# Patient Record
Sex: FEMALE | Race: WHITE | NOT HISPANIC OR LATINO | URBAN - METROPOLITAN AREA
[De-identification: names, ages, dates, MRNs, and addresses within clinical notes are randomized per-mention and may not be internally consistent; named-entity substitution may affect disease eponyms.]

---

## 2019-02-26 ENCOUNTER — OUTPATIENT (OUTPATIENT)
Dept: OUTPATIENT SERVICES | Facility: HOSPITAL | Age: 74
LOS: 1 days | End: 2019-02-26
Payer: MEDICARE

## 2019-02-26 ENCOUNTER — RESULT REVIEW (OUTPATIENT)
Age: 74
End: 2019-02-26

## 2019-02-26 DIAGNOSIS — Z80.3 FAMILY HISTORY OF MALIGNANT NEOPLASM OF BREAST: ICD-10-CM

## 2019-02-26 DIAGNOSIS — C50.411 MALIGNANT NEOPLASM OF UPPER-OUTER QUADRANT OF RIGHT FEMALE BREAST: ICD-10-CM

## 2019-02-26 DIAGNOSIS — N62 HYPERTROPHY OF BREAST: ICD-10-CM

## 2019-02-26 DIAGNOSIS — Z17.0 ESTROGEN RECEPTOR POSITIVE STATUS [ER+]: ICD-10-CM

## 2019-02-26 LAB — SURGICAL PATHOLOGY STUDY: SIGNIFICANT CHANGE UP

## 2019-02-26 PROCEDURE — 88321 CONSLTJ&REPRT SLD PREP ELSWR: CPT

## 2019-02-27 ENCOUNTER — OUTPATIENT (OUTPATIENT)
Dept: OUTPATIENT SERVICES | Facility: HOSPITAL | Age: 74
LOS: 1 days | End: 2019-02-27
Payer: MEDICARE

## 2019-02-27 VITALS — WEIGHT: 164.91 LBS | HEIGHT: 63 IN

## 2019-02-27 VITALS
HEIGHT: 63 IN | RESPIRATION RATE: 18 BRPM | HEART RATE: 74 BPM | DIASTOLIC BLOOD PRESSURE: 78 MMHG | WEIGHT: 164.91 LBS | SYSTOLIC BLOOD PRESSURE: 150 MMHG | TEMPERATURE: 99 F | OXYGEN SATURATION: 96 %

## 2019-02-27 DIAGNOSIS — Z17.0 ESTROGEN RECEPTOR POSITIVE STATUS [ER+]: ICD-10-CM

## 2019-02-27 DIAGNOSIS — Z01.818 ENCOUNTER FOR OTHER PREPROCEDURAL EXAMINATION: ICD-10-CM

## 2019-02-27 DIAGNOSIS — Z96.641 PRESENCE OF RIGHT ARTIFICIAL HIP JOINT: Chronic | ICD-10-CM

## 2019-02-27 DIAGNOSIS — Z80.3 FAMILY HISTORY OF MALIGNANT NEOPLASM OF BREAST: ICD-10-CM

## 2019-02-27 DIAGNOSIS — Z90.710 ACQUIRED ABSENCE OF BOTH CERVIX AND UTERUS: Chronic | ICD-10-CM

## 2019-02-27 DIAGNOSIS — N62 HYPERTROPHY OF BREAST: ICD-10-CM

## 2019-02-27 DIAGNOSIS — C50.411 MALIGNANT NEOPLASM OF UPPER-OUTER QUADRANT OF RIGHT FEMALE BREAST: ICD-10-CM

## 2019-02-27 LAB
ANION GAP SERPL CALC-SCNC: 9 MMOL/L — SIGNIFICANT CHANGE UP (ref 5–17)
BUN SERPL-MCNC: 31 MG/DL — HIGH (ref 7–23)
CALCIUM SERPL-MCNC: 10.1 MG/DL — SIGNIFICANT CHANGE UP (ref 8.4–10.5)
CHLORIDE SERPL-SCNC: 105 MMOL/L — SIGNIFICANT CHANGE UP (ref 96–108)
CO2 SERPL-SCNC: 30 MMOL/L — SIGNIFICANT CHANGE UP (ref 22–31)
CREAT SERPL-MCNC: 1.35 MG/DL — HIGH (ref 0.5–1.3)
GLUCOSE SERPL-MCNC: 139 MG/DL — HIGH (ref 70–99)
HCT VFR BLD CALC: 53.8 % — HIGH (ref 34.5–45)
HGB BLD-MCNC: 17 G/DL — HIGH (ref 11.5–15.5)
MCHC RBC-ENTMCNC: 31.6 GM/DL — LOW (ref 32–36)
MCHC RBC-ENTMCNC: 31.9 PG — SIGNIFICANT CHANGE UP (ref 27–34)
MCV RBC AUTO: 100.9 FL — HIGH (ref 80–100)
PLATELET # BLD AUTO: 254 K/UL — SIGNIFICANT CHANGE UP (ref 150–400)
POTASSIUM SERPL-MCNC: 4.7 MMOL/L — SIGNIFICANT CHANGE UP (ref 3.5–5.3)
POTASSIUM SERPL-SCNC: 4.7 MMOL/L — SIGNIFICANT CHANGE UP (ref 3.5–5.3)
RBC # BLD: 5.33 M/UL — HIGH (ref 3.8–5.2)
RBC # FLD: 15 % — HIGH (ref 10.3–14.5)
SODIUM SERPL-SCNC: 144 MMOL/L — SIGNIFICANT CHANGE UP (ref 135–145)
WBC # BLD: 8.4 K/UL — SIGNIFICANT CHANGE UP (ref 3.8–10.5)
WBC # FLD AUTO: 8.4 K/UL — SIGNIFICANT CHANGE UP (ref 3.8–10.5)

## 2019-02-27 PROCEDURE — 93010 ELECTROCARDIOGRAM REPORT: CPT | Mod: NC

## 2019-02-27 PROCEDURE — 85027 COMPLETE CBC AUTOMATED: CPT

## 2019-02-27 PROCEDURE — 93005 ELECTROCARDIOGRAM TRACING: CPT

## 2019-02-27 PROCEDURE — 36415 COLL VENOUS BLD VENIPUNCTURE: CPT

## 2019-02-27 PROCEDURE — 80048 BASIC METABOLIC PNL TOTAL CA: CPT

## 2019-02-27 PROCEDURE — G0463: CPT

## 2019-02-27 NOTE — H&P PST ADULT - FAMILY HISTORY
Father  Still living? No  Family history of meningitis, Age at diagnosis: Age Unknown     Mother  Still living? No  Family history of breast cancer, Age at diagnosis: Age Unknown  Family history of hypertension, Age at diagnosis: Age Unknown     Sibling  Still living? Yes, Estimated age: Age Unknown  Family history of thyroid disease, Age at diagnosis: Age Unknown

## 2019-02-27 NOTE — H&P PST ADULT - RS GEN PE MLT RESP DETAILS PC
good air movement/no chest wall tenderness/airway patent/normal/breath sounds equal/clear to auscultation bilaterally/respirations non-labored

## 2019-02-27 NOTE — H&P PST ADULT - HISTORY OF PRESENT ILLNESS
72 y/o female presents to PST. A per patient she had routine mammo and sonogram done 01/2019 and abnormality was noted to right breast, biopsy done and resulted malignant neoplasm.

## 2019-02-27 NOTE — H&P PST ADULT - PROBLEM SELECTOR PROBLEM 1
Malignant neoplasm of upper-outer quadrant of right female breast, unspecified estrogen receptor status

## 2019-02-27 NOTE — H&P PST ADULT - PMH
Diabetes mellitus    Essential hypertension    Gout    Hyperlipemia    Hypothyroidism    Malignant neoplasm of upper-outer quadrant of right female breast, unspecified estrogen receptor status    Prolapsed uterus

## 2019-02-28 ENCOUNTER — TRANSCRIPTION ENCOUNTER (OUTPATIENT)
Age: 74
End: 2019-02-28

## 2019-03-01 ENCOUNTER — OUTPATIENT (OUTPATIENT)
Dept: OUTPATIENT SERVICES | Facility: HOSPITAL | Age: 74
LOS: 1 days | End: 2019-03-01
Payer: MEDICARE

## 2019-03-01 ENCOUNTER — RESULT REVIEW (OUTPATIENT)
Age: 74
End: 2019-03-01

## 2019-03-01 VITALS
DIASTOLIC BLOOD PRESSURE: 70 MMHG | TEMPERATURE: 97 F | OXYGEN SATURATION: 99 % | RESPIRATION RATE: 16 BRPM | SYSTOLIC BLOOD PRESSURE: 126 MMHG | HEART RATE: 70 BPM

## 2019-03-01 VITALS
DIASTOLIC BLOOD PRESSURE: 89 MMHG | TEMPERATURE: 98 F | RESPIRATION RATE: 15 BRPM | HEART RATE: 80 BPM | HEIGHT: 63 IN | SYSTOLIC BLOOD PRESSURE: 146 MMHG | WEIGHT: 164.91 LBS | OXYGEN SATURATION: 99 %

## 2019-03-01 DIAGNOSIS — C50.411 MALIGNANT NEOPLASM OF UPPER-OUTER QUADRANT OF RIGHT FEMALE BREAST: ICD-10-CM

## 2019-03-01 DIAGNOSIS — Z90.710 ACQUIRED ABSENCE OF BOTH CERVIX AND UTERUS: Chronic | ICD-10-CM

## 2019-03-01 DIAGNOSIS — Z17.0 ESTROGEN RECEPTOR POSITIVE STATUS [ER+]: ICD-10-CM

## 2019-03-01 DIAGNOSIS — Z80.3 FAMILY HISTORY OF MALIGNANT NEOPLASM OF BREAST: ICD-10-CM

## 2019-03-01 DIAGNOSIS — Z96.641 PRESENCE OF RIGHT ARTIFICIAL HIP JOINT: Chronic | ICD-10-CM

## 2019-03-01 DIAGNOSIS — N62 HYPERTROPHY OF BREAST: ICD-10-CM

## 2019-03-01 LAB
GLUCOSE BLDC GLUCOMTR-MCNC: 102 MG/DL — HIGH (ref 70–99)
GLUCOSE BLDC GLUCOMTR-MCNC: 121 MG/DL — HIGH (ref 70–99)

## 2019-03-01 PROCEDURE — 88305 TISSUE EXAM BY PATHOLOGIST: CPT | Mod: 26

## 2019-03-01 PROCEDURE — C1889: CPT

## 2019-03-01 PROCEDURE — 19281 PERQ DEVICE BREAST 1ST IMAG: CPT

## 2019-03-01 PROCEDURE — 88342 IMHCHEM/IMCYTCHM 1ST ANTB: CPT

## 2019-03-01 PROCEDURE — 38900 IO MAP OF SENT LYMPH NODE: CPT | Mod: AS,RT

## 2019-03-01 PROCEDURE — 88334 PATH CONSLTJ SURG CYTO XM EA: CPT

## 2019-03-01 PROCEDURE — 82962 GLUCOSE BLOOD TEST: CPT

## 2019-03-01 PROCEDURE — 88307 TISSUE EXAM BY PATHOLOGIST: CPT

## 2019-03-01 PROCEDURE — 38900 IO MAP OF SENT LYMPH NODE: CPT

## 2019-03-01 PROCEDURE — 88342 IMHCHEM/IMCYTCHM 1ST ANTB: CPT | Mod: 26

## 2019-03-01 PROCEDURE — 38525 BIOPSY/REMOVAL LYMPH NODES: CPT | Mod: RT

## 2019-03-01 PROCEDURE — 76098 X-RAY EXAM SURGICAL SPECIMEN: CPT | Mod: 26

## 2019-03-01 PROCEDURE — 88341 IMHCHEM/IMCYTCHM EA ADD ANTB: CPT

## 2019-03-01 PROCEDURE — 19302 P-MASTECTOMY W/LN REMOVAL: CPT | Mod: AS,RT

## 2019-03-01 PROCEDURE — 88307 TISSUE EXAM BY PATHOLOGIST: CPT | Mod: 26

## 2019-03-01 PROCEDURE — 88333 PATH CONSLTJ SURG CYTO XM 1: CPT | Mod: 26

## 2019-03-01 PROCEDURE — 88341 IMHCHEM/IMCYTCHM EA ADD ANTB: CPT | Mod: 26

## 2019-03-01 PROCEDURE — 19301 PARTIAL MASTECTOMY: CPT | Mod: RT

## 2019-03-01 PROCEDURE — 19281 PERQ DEVICE BREAST 1ST IMAG: CPT | Mod: RT

## 2019-03-01 PROCEDURE — 88334 PATH CONSLTJ SURG CYTO XM EA: CPT | Mod: 26

## 2019-03-01 PROCEDURE — 88333 PATH CONSLTJ SURG CYTO XM 1: CPT

## 2019-03-01 PROCEDURE — 76098 X-RAY EXAM SURGICAL SPECIMEN: CPT

## 2019-03-01 PROCEDURE — 88305 TISSUE EXAM BY PATHOLOGIST: CPT

## 2019-03-01 RX ORDER — ONDANSETRON 8 MG/1
4 TABLET, FILM COATED ORAL ONCE
Qty: 0 | Refills: 0 | Status: DISCONTINUED | OUTPATIENT
Start: 2019-03-01 | End: 2019-03-01

## 2019-03-01 RX ORDER — SODIUM CHLORIDE 9 MG/ML
1000 INJECTION, SOLUTION INTRAVENOUS
Qty: 0 | Refills: 0 | Status: DISCONTINUED | OUTPATIENT
Start: 2019-03-01 | End: 2019-03-01

## 2019-03-01 RX ORDER — OXYCODONE HYDROCHLORIDE 5 MG/1
5 TABLET ORAL
Qty: 0 | Refills: 0 | Status: DISCONTINUED | OUTPATIENT
Start: 2019-03-01 | End: 2019-03-01

## 2019-03-01 RX ORDER — HYDROMORPHONE HYDROCHLORIDE 2 MG/ML
0.5 INJECTION INTRAMUSCULAR; INTRAVENOUS; SUBCUTANEOUS
Qty: 0 | Refills: 0 | Status: DISCONTINUED | OUTPATIENT
Start: 2019-03-01 | End: 2019-03-01

## 2019-03-01 RX ADMIN — SODIUM CHLORIDE 50 MILLILITER(S): 9 INJECTION, SOLUTION INTRAVENOUS at 12:03

## 2019-03-01 NOTE — BRIEF OPERATIVE NOTE - PRE-OP DX
Breast cancer  03/01/2019  Right Breast cancer / and Lobular atypical hyperplasia  Active  Graciela Hollins

## 2019-03-01 NOTE — BRIEF OPERATIVE NOTE - OPERATION/FINDINGS
Right Breast cancer Localization site   KIZZY localization site  sentinel node right x2  negative on frozen section

## 2019-03-01 NOTE — ASU DISCHARGE PLAN (ADULT/PEDIATRIC). - MEDICATION SUMMARY - MEDICATIONS TO TAKE
I will START or STAY ON the medications listed below when I get home from the hospital:    ramipril 10 mg oral capsule  -- 1 cap(s) by mouth once a day  -- Indication: For Htn     glimepiride 2 mg oral tablet  -- 1 tab(s) by mouth once a day  -- Indication: For diabetes     allopurinol 300 mg oral tablet  -- 1 tab(s) by mouth once a day  -- Indication: For gout     pravastatin 20 mg oral tablet  -- 1 tab(s) by mouth once a day  -- Indication: For cholesterol     indapamide 1.25 mg oral tablet  -- 1 tab(s) by mouth once a day (in the morning)  -- Indication: For diuretic     Cinnamon 500 mg oral capsule  -- 2 cap(s) by mouth once a day  -- Indication: For supplement     Cosamin  mg-400 mg oral tablet  -- orally once a day  -- Indication: For supplement     levothyroxine 88 mcg (0.088 mg) oral capsule  -- 1 cap(s) by mouth once a day  -- Indication: For thyroid     Multiple Vitamins oral tablet  -- 1 tab(s) by mouth once a day  -- Indication: For supplement     Vitamin D3 1000 intl units oral tablet  -- 1 tab(s) by mouth once a day  -- Indication: For supplement

## 2019-03-01 NOTE — BRIEF OPERATIVE NOTE - PROCEDURE
<<-----Click on this checkbox to enter Procedure Breast biopsy, right  03/01/2019  Wide resection right breast localization site 2 sites  Active  [id number]

## 2019-03-06 LAB — SURGICAL PATHOLOGY STUDY: SIGNIFICANT CHANGE UP

## 2019-04-17 PROBLEM — E03.9 HYPOTHYROIDISM, UNSPECIFIED: Chronic | Status: ACTIVE | Noted: 2019-02-27

## 2019-04-17 PROBLEM — N81.4 UTEROVAGINAL PROLAPSE, UNSPECIFIED: Chronic | Status: ACTIVE | Noted: 2019-02-27

## 2019-04-17 PROBLEM — C50.411 MALIGNANT NEOPLASM OF UPPER-OUTER QUADRANT OF RIGHT FEMALE BREAST: Chronic | Status: ACTIVE | Noted: 2019-02-27

## 2019-04-17 PROBLEM — M10.9 GOUT, UNSPECIFIED: Chronic | Status: ACTIVE | Noted: 2019-02-27

## 2019-04-17 PROBLEM — I10 ESSENTIAL (PRIMARY) HYPERTENSION: Chronic | Status: ACTIVE | Noted: 2019-02-27

## 2019-04-18 ENCOUNTER — TRANSCRIPTION ENCOUNTER (OUTPATIENT)
Age: 74
End: 2019-04-18

## 2019-04-19 ENCOUNTER — OUTPATIENT (OUTPATIENT)
Dept: OUTPATIENT SERVICES | Facility: HOSPITAL | Age: 74
LOS: 1 days | End: 2019-04-19
Payer: MEDICARE

## 2019-04-19 ENCOUNTER — RESULT REVIEW (OUTPATIENT)
Age: 74
End: 2019-04-19

## 2019-04-19 VITALS
DIASTOLIC BLOOD PRESSURE: 69 MMHG | SYSTOLIC BLOOD PRESSURE: 128 MMHG | HEIGHT: 63 IN | RESPIRATION RATE: 18 BRPM | TEMPERATURE: 98 F | WEIGHT: 164.91 LBS | OXYGEN SATURATION: 97 % | HEART RATE: 73 BPM

## 2019-04-19 VITALS
HEART RATE: 78 BPM | TEMPERATURE: 98 F | RESPIRATION RATE: 16 BRPM | DIASTOLIC BLOOD PRESSURE: 60 MMHG | SYSTOLIC BLOOD PRESSURE: 125 MMHG | OXYGEN SATURATION: 95 %

## 2019-04-19 DIAGNOSIS — Z80.3 FAMILY HISTORY OF MALIGNANT NEOPLASM OF BREAST: ICD-10-CM

## 2019-04-19 DIAGNOSIS — Z96.641 PRESENCE OF RIGHT ARTIFICIAL HIP JOINT: Chronic | ICD-10-CM

## 2019-04-19 DIAGNOSIS — Z98.890 OTHER SPECIFIED POSTPROCEDURAL STATES: Chronic | ICD-10-CM

## 2019-04-19 DIAGNOSIS — N62 HYPERTROPHY OF BREAST: ICD-10-CM

## 2019-04-19 DIAGNOSIS — C50.411 MALIGNANT NEOPLASM OF UPPER-OUTER QUADRANT OF RIGHT FEMALE BREAST: ICD-10-CM

## 2019-04-19 DIAGNOSIS — Z90.710 ACQUIRED ABSENCE OF BOTH CERVIX AND UTERUS: Chronic | ICD-10-CM

## 2019-04-19 DIAGNOSIS — Z17.0 ESTROGEN RECEPTOR POSITIVE STATUS [ER+]: ICD-10-CM

## 2019-04-19 LAB — GLUCOSE BLDC GLUCOMTR-MCNC: 122 MG/DL — HIGH (ref 70–99)

## 2019-04-19 PROCEDURE — 88305 TISSUE EXAM BY PATHOLOGIST: CPT

## 2019-04-19 PROCEDURE — 88307 TISSUE EXAM BY PATHOLOGIST: CPT | Mod: 26

## 2019-04-19 PROCEDURE — 88305 TISSUE EXAM BY PATHOLOGIST: CPT | Mod: 26

## 2019-04-19 PROCEDURE — 19301 PARTIAL MASTECTOMY: CPT | Mod: AS,RT,78

## 2019-04-19 PROCEDURE — 88307 TISSUE EXAM BY PATHOLOGIST: CPT

## 2019-04-19 PROCEDURE — 19301 PARTIAL MASTECTOMY: CPT | Mod: RT

## 2019-04-19 PROCEDURE — 82962 GLUCOSE BLOOD TEST: CPT

## 2019-04-19 RX ORDER — ONDANSETRON 8 MG/1
4 TABLET, FILM COATED ORAL ONCE
Qty: 0 | Refills: 0 | Status: COMPLETED | OUTPATIENT
Start: 2019-04-19 | End: 2019-04-19

## 2019-04-19 RX ORDER — HYDROMORPHONE HYDROCHLORIDE 2 MG/ML
0.5 INJECTION INTRAMUSCULAR; INTRAVENOUS; SUBCUTANEOUS
Qty: 0 | Refills: 0 | Status: DISCONTINUED | OUTPATIENT
Start: 2019-04-19 | End: 2019-04-19

## 2019-04-19 RX ORDER — ONDANSETRON 8 MG/1
4 TABLET, FILM COATED ORAL EVERY 6 HOURS
Qty: 0 | Refills: 0 | Status: DISCONTINUED | OUTPATIENT
Start: 2019-04-19 | End: 2019-05-04

## 2019-04-19 RX ORDER — OXYCODONE HYDROCHLORIDE 5 MG/1
5 TABLET ORAL
Qty: 0 | Refills: 0 | Status: DISCONTINUED | OUTPATIENT
Start: 2019-04-19 | End: 2019-04-19

## 2019-04-19 RX ORDER — SODIUM CHLORIDE 9 MG/ML
1000 INJECTION, SOLUTION INTRAVENOUS
Qty: 0 | Refills: 0 | Status: DISCONTINUED | OUTPATIENT
Start: 2019-04-19 | End: 2019-04-19

## 2019-04-19 RX ORDER — ACETAMINOPHEN 500 MG
650 TABLET ORAL EVERY 6 HOURS
Qty: 0 | Refills: 0 | Status: DISCONTINUED | OUTPATIENT
Start: 2019-04-19 | End: 2019-05-04

## 2019-04-19 RX ORDER — HYDROMORPHONE HYDROCHLORIDE 2 MG/ML
1 INJECTION INTRAMUSCULAR; INTRAVENOUS; SUBCUTANEOUS
Qty: 0 | Refills: 0 | Status: DISCONTINUED | OUTPATIENT
Start: 2019-04-19 | End: 2019-04-19

## 2019-04-19 RX ADMIN — ONDANSETRON 4 MILLIGRAM(S): 8 TABLET, FILM COATED ORAL at 16:46

## 2019-04-19 RX ADMIN — SODIUM CHLORIDE 50 MILLILITER(S): 9 INJECTION, SOLUTION INTRAVENOUS at 12:57

## 2019-04-19 NOTE — ASU PATIENT PROFILE, ADULT - VISION (WITH CORRECTIVE LENSES IF THE PATIENT USUALLY WEARS THEM):
Normal vision: sees adequately in most situations; can see medication labels, newsprint Normal vision: sees adequately in most situations; can see medication labels, newsprint/eyeglasses

## 2019-04-19 NOTE — ASU DISCHARGE PLAN (ADULT/PEDIATRIC) - ASU DC SPECIAL INSTRUCTIONSFT
Remove outer dressing tomorrow and then may shower over steri strips   Wear bra day and night for 2 weeks   Ice pack to right breast as needed for comfort   Drink plenty of fluids and take a stool softener if taking narcotics to avoid constipation   Follow up with Dr Knapp in 10 to 14 days , call office for appointment

## 2019-04-19 NOTE — ASU DISCHARGE PLAN (ADULT/PEDIATRIC) - PROCEDURE
Wide Resection right breast Add 52 Modifier (Optional): no Medical Necessity Information: It is in your best interest to select a reason for this procedure from the list below. All of these items fulfill various CMS LCD requirements except the new and changing color options. Medical Necessity Clause: This procedure was medically necessary because the lesions that were treated were: Post-Care Instructions: I reviewed with the patient in detail post-care instructions. Patient is to wear sunprotection, and avoid picking at any of the treated lesions. Pt may apply Vaseline to crusted or scabbing areas. Include Z78.9 (Other Specified Conditions Influencing Health Status) As An Associated Diagnosis?: Yes Consent: The patient's consent was obtained including but not limited to risks of crusting, scabbing, blistering, scarring, darker or lighter pigmentary change, recurrence, incomplete removal and infection. Detail Level: Detailed

## 2019-04-19 NOTE — ASU PATIENT PROFILE, ADULT - PSH
History of hip replacement, total, right  2007  Status post hysterectomy  Partial 1997 History of hip replacement, total, right  2007  History of right breast biopsy  3/1/19  Status post hysterectomy  Partial 1997

## 2019-04-19 NOTE — BRIEF OPERATIVE NOTE - NSICDXBRIEFPROCEDURE_GEN_ALL_CORE_FT
PROCEDURES:  Repeat excision of malignant neoplasm of right breast 19-Apr-2019 16:52:07 wide resection Graciela Hollins

## 2019-04-19 NOTE — ASU DISCHARGE PLAN (ADULT/PEDIATRIC) - CARE PROVIDER_API CALL
Alethea Knapp)  Maria Teresa Desean [facility]  [address], [address]  [place] [address]  Phone: [phone]  Fax: [phone]  Follow Up Time: 2 weeks

## 2019-04-24 LAB — SURGICAL PATHOLOGY STUDY: SIGNIFICANT CHANGE UP

## 2019-07-23 ENCOUNTER — APPOINTMENT (OUTPATIENT)
Dept: ORTHOPEDIC SURGERY | Facility: CLINIC | Age: 74
End: 2019-07-23
Payer: MEDICARE

## 2019-07-23 DIAGNOSIS — M25.561 PAIN IN RIGHT KNEE: ICD-10-CM

## 2019-07-23 DIAGNOSIS — M25.552 PAIN IN LEFT HIP: ICD-10-CM

## 2019-07-23 PROCEDURE — 20610 DRAIN/INJ JOINT/BURSA W/O US: CPT | Mod: RT

## 2019-07-23 PROCEDURE — 99214 OFFICE O/P EST MOD 30 MIN: CPT | Mod: 25

## 2019-07-23 RX ORDER — HYALURONATE SOD, CROSS-LINKED 30 MG/3 ML
30 SYRINGE (ML) INTRAARTICULAR
Refills: 0 | Status: COMPLETED | OUTPATIENT
Start: 2019-07-23

## 2019-07-23 RX ADMIN — Medication 0 MG/3ML: at 00:00

## 2019-07-23 NOTE — DISCUSSION/SUMMARY
[Medication Risks Reviewed] : Medication risks reviewed [Surgical risks reviewed] : Surgical risks reviewed [de-identified] : Orthovisc injection was given to the patients right knee.\par Physical therapy prescription was given to strengthen the quads and hamstrings

## 2019-07-23 NOTE — HISTORY OF PRESENT ILLNESS
[Pain Location] : pain [Worsening] : worsening [] : right knee [5] : a minimum pain level of 5/10 [10] : a maximum pain level of 10/10 [Sitting] : sitting [Bending] : worsened by bending [Daily] : ~He/She~ states the symptoms seem to be occuring daily [Rest] : relieved by rest [Knee Flexion] : worsened with knee flexion [de-identified] : Patient is known to us for right total hip replacement that she had on March 1, 2006. Patient has recently undergone breast cancer surgery with postoperative radiation treatment and is currently on hormone immunotherapy patient does have traveling arthralgias that are not consistent on a daily basis of all her joint pain except for the right knee. The right knee he has been giving her trouble on a daily basis with her activity of daily living getting up from a seated position as well as utilizing the stairs. Patient is using her 3 in one commode to help her while she is utilizing toilet. [Walking] : not worsened by walking [de-identified] : stairs

## 2019-07-23 NOTE — PROCEDURE
[Injection] : Injection [Right] : of the right [Osteoarthritis] : Osteoarthritis [Knee Joint] : knee joint [Patient] : patient [Joint Pain] : Joint pain [Risk] : risk [Alternatives] : alternatives [Benefits] : benefits [Bleeding] : bleeding [Infection] : infection [Allergic Reaction] : allergic reaction [Verbal Consent Obtained] : verbal consent was obtained prior to the procedure [Ethyl Chloride Spray] : ethyl chloride spray was used as a topical anesthetic [Betadine] : Betadine [Inferior] : inferior [Medial] : medial [Bandage Applied] : a bandage [1% Lidocaine___(mL)] : [unfilled] mL of 1% Lidocaine [Same Needle/New Syringe] : the syringe was changed and the same needle was left in place and [Tolerated Well] : The patient tolerated the procedure well [No Strenuous Activity___day(s)] : avoid strenuous activity for [unfilled] day(s) [None] : none [FreeTextEntry1] : and alcohol prep [___ Month(s)] : in [unfilled] month(s) [de-identified] : of the right patella

## 2019-07-23 NOTE — PHYSICAL EXAM
[UE/LE] : Sensory: Intact in bilateral upper & lower extremities [ALL] : dorsalis pedis, posterior tibial, femoral, popliteal, and radial 2+ and symmetric bilaterally [Knee Motion Right Crepitus] : crepitus with ROM [Knee Motion Right] : full ROM [Knee Instability Laxity Right Anterior Cruciate Ligament] : positive pivot shift test [Knee Motion Left] : full ROM [Normal RLE] : Right Lower Extremity: No scars, rashes, lesions, ulcers, skin intact [Normal Touch] : sensation intact for touch [Normal LLE] : Left Lower Extremity: No scars, rashes, lesions, ulcers, skin intact [Normal] : Oriented to person, place, and time, insight and judgement were intact and the affect was normal [Sacroiliac Oumar-Fabere Test Left Side] : negative David [Left Hip Was Examined] : negative impingement test [Knee Swelling Right] : no swelling [Knee Tenderness On Palpation Right] : no tenderness [Knee Anterior Drawer Sign Right] : negative anterior drawer sign [Knee Posterior Drawer Sign Right] : negative posterior drawer sign [Knee Medial Instability Right] : no laxity on valgus stress [Knee Tenderness On Palpation Left] : no tenderness [Knee Lateral Instability Right] : no laxity on varus stress [Knee Swelling Left] : no swelling [Knee Motion Left Crepitus] : no crepitus with ROM [Knee Posterior Drawer Sign Left] : negative posterior drawer sign [Knee Tender On Palp With Quadriceps Contracted (Shrug Sign)] : negative patellar grind [Knee Anterior Drawer Sign Left] : negative anterior drawer sign [Knee Lateral Instability Left] : no  laxity on varus stress [Knee Medial Instability Left] : no laxity on valgus stress [Knee Instability Laxity Left Anterior Cruciate Ligament] : negative pivot shift test [Acute Distress] : not in acute distress [Poor Appearance] : well-appearing [Abl Affect] : with normal affect [Obese] : not obese [Poor Coordination] : normal coordination [Disorientation] : oriented x 3 [de-identified] : X-rays 3 views of the right knee demonstrate a mild degenerative varus knee, DJD is most prominent at the patellofemoral joint space seen on the lateral view and sunrise view of the right knee.\par AP pelvis and lateral view of the right total hip replacement radiographs of the pelvis and right hip were performed today. There are stable interfaces between bone and implant in the femur and acetabulum. There is stable positioning of the femoral and acetabular components. There is no fracture, foreign body, subsidence, osteolysis, or notable effusion. The lesser trochanters of each femur are aligned on Shenton's line. No heterotopic ossification is noted [de-identified] : Right knee full extension of 0° and flexion to 125+ degrees\par mild effusion is noted when compared to the left knee

## 2019-07-31 ENCOUNTER — CHART COPY (OUTPATIENT)
Age: 74
End: 2019-07-31

## 2019-10-29 ENCOUNTER — APPOINTMENT (OUTPATIENT)
Dept: ORTHOPEDIC SURGERY | Facility: CLINIC | Age: 74
End: 2019-10-29

## 2020-03-30 NOTE — ASU PREOP CHECKLIST - WEIGHT IN KG
Writer spoke with patient she will be scheduled in her office for nurse visit for BP and weight check   74.8

## 2020-07-17 ENCOUNTER — TRANSCRIPTION ENCOUNTER (OUTPATIENT)
Age: 75
End: 2020-07-17

## 2021-07-20 ENCOUNTER — RESULT REVIEW (OUTPATIENT)
Age: 76
End: 2021-07-20

## 2021-07-20 ENCOUNTER — OUTPATIENT (OUTPATIENT)
Dept: OUTPATIENT SERVICES | Facility: HOSPITAL | Age: 76
LOS: 1 days | Discharge: HOME | End: 2021-07-20

## 2021-07-20 DIAGNOSIS — Z90.710 ACQUIRED ABSENCE OF BOTH CERVIX AND UTERUS: Chronic | ICD-10-CM

## 2021-07-20 DIAGNOSIS — Z96.641 PRESENCE OF RIGHT ARTIFICIAL HIP JOINT: Chronic | ICD-10-CM

## 2021-07-20 DIAGNOSIS — Z98.890 OTHER SPECIFIED POSTPROCEDURAL STATES: Chronic | ICD-10-CM

## 2021-07-21 LAB — SURGICAL PATHOLOGY STUDY: SIGNIFICANT CHANGE UP

## 2021-12-14 ENCOUNTER — OUTPATIENT (OUTPATIENT)
Dept: OUTPATIENT SERVICES | Facility: HOSPITAL | Age: 76
LOS: 1 days | Discharge: HOME | End: 2021-12-14

## 2021-12-14 DIAGNOSIS — C50.919 MALIGNANT NEOPLASM OF UNSPECIFIED SITE OF UNSPECIFIED FEMALE BREAST: ICD-10-CM

## 2021-12-14 DIAGNOSIS — Z96.641 PRESENCE OF RIGHT ARTIFICIAL HIP JOINT: Chronic | ICD-10-CM

## 2021-12-14 DIAGNOSIS — Z90.710 ACQUIRED ABSENCE OF BOTH CERVIX AND UTERUS: Chronic | ICD-10-CM

## 2021-12-14 DIAGNOSIS — Z98.890 OTHER SPECIFIED POSTPROCEDURAL STATES: Chronic | ICD-10-CM

## 2021-12-14 RX ORDER — ALLOPURINOL 300 MG/1
300 TABLET ORAL DAILY
Refills: 0 | Status: ACTIVE | COMMUNITY
Start: 2021-12-14

## 2021-12-14 RX ORDER — DICLOFENAC SODIUM 10 MG/G
1 GEL TOPICAL
Qty: 1 | Refills: 0 | Status: DISCONTINUED | COMMUNITY
Start: 2019-07-31 | End: 2021-12-14

## 2021-12-16 ENCOUNTER — APPOINTMENT (OUTPATIENT)
Dept: HEMATOLOGY ONCOLOGY | Facility: CLINIC | Age: 76
End: 2021-12-16
Payer: MEDICARE

## 2021-12-16 VITALS
WEIGHT: 147.49 LBS | RESPIRATION RATE: 14 BRPM | HEIGHT: 61.85 IN | DIASTOLIC BLOOD PRESSURE: 83 MMHG | SYSTOLIC BLOOD PRESSURE: 131 MMHG | BODY MASS INDEX: 27.14 KG/M2 | OXYGEN SATURATION: 99 % | HEART RATE: 75 BPM | TEMPERATURE: 98.2 F

## 2021-12-16 DIAGNOSIS — Z87.891 PERSONAL HISTORY OF NICOTINE DEPENDENCE: ICD-10-CM

## 2021-12-16 DIAGNOSIS — M10.9 GOUT, UNSPECIFIED: ICD-10-CM

## 2021-12-16 DIAGNOSIS — E78.00 PURE HYPERCHOLESTEROLEMIA, UNSPECIFIED: ICD-10-CM

## 2021-12-16 DIAGNOSIS — I10 ESSENTIAL (PRIMARY) HYPERTENSION: ICD-10-CM

## 2021-12-16 DIAGNOSIS — E03.9 HYPOTHYROIDISM, UNSPECIFIED: ICD-10-CM

## 2021-12-16 DIAGNOSIS — Z80.3 FAMILY HISTORY OF MALIGNANT NEOPLASM OF BREAST: ICD-10-CM

## 2021-12-16 DIAGNOSIS — E11.9 TYPE 2 DIABETES MELLITUS W/OUT COMPLICATIONS: ICD-10-CM

## 2021-12-16 DIAGNOSIS — M1A.08X0: ICD-10-CM

## 2021-12-16 PROCEDURE — 99205 OFFICE O/P NEW HI 60 MIN: CPT

## 2021-12-23 NOTE — CONSULT LETTER
[Dear  ___] : Dear  [unfilled], [Consult Letter:] : I had the pleasure of evaluating your patient, [unfilled]. [Please see my note below.] : Please see my note below. [Consult Closing:] : Thank you very much for allowing me to participate in the care of this patient.  If you have any questions, please do not hesitate to contact me. [Sincerely,] : Sincerely, [DrRivka  ___] : Dr. JOHNSON [DrRivka ___] : Dr. JOHNSON [FreeTextEntry2] : Alethea Knapp MD\par [address]\[place] [address] [FreeTextEntry3] : Ramonita Bautista MD\par Associate Chief \par [facility]\par [facility] [place]\par  of Medicine\par [facility] [employer]\par \par

## 2021-12-23 NOTE — HISTORY OF PRESENT ILLNESS
[Disease: _____________________] : Disease: [unfilled] [T: ___] : T[unfilled] [N: ___] : N[unfilled] [M: ___] : M[unfilled] [AJCC Stage: ____] : AJCC Stage: [unfilled] [de-identified] : Right breast cancer at age 74\par 01/23/19 Right breast sono guided core bx revealed a 0.7 cm invasive mammary carcinoma with ductal and lobular features, SBR 6/9, ER 80%, PA 60%, HER-2(2+) FISH-\par 02/06/19 MRI of the breast showed the biopsy clip with minimal associated enhancement is noted, likely from the biopsy proven malignancy. Additional indeterminate enhancing nodule in the lateral right breast, for which biopsy was advised. \par 03/01/19 Right mammo localized wide resection revealed a 1.05 cm invasive carcinoma with ductal and lobular features, SBR 6-7/9 with LVI, 0/2 LN involved. LCIS, pleomorphic      type and focal ADH. Right inferior margin, lateral margin, anterior and superior margin revealed focal pleomorphic LCIS and  ALH in inferior, lateral and deep margins. All     margins uninvolved by invasive carcinoma.\par 03/21/19 Oncotype Score of 20 with distant recurrence risk at 9 years with AI or BRAXTON alone is 6%\par 04/19/19 Right breast wide resection revealed LCIS, classic and pleomorphic types. No invasive carcinoma, DCIS or lymphovascular invasion identified. The resection margins are negative for carcinoma. Pleomorphic LCIS is 0.8 mm from the nearest margin (medial).\par 5/2019 Adjuvant radiation with Dr. Rinaldi\par 7/19 - 10/19 Anastrozole discontinued due to severe hot flashes\par 10/19 Letrozole started by Dr. Adhikari\par 10/14/19 Right breast 10 o'clock axis 4 cm FN FNA was negative for malignancy\par  [de-identified] : 1.05 cm invasive carcinoma with ductal and lobular features, SBR 6-7/9, ER 80%, DC 60%, HER-2 ( 2+) FISH (-) with LVI  0/2 SLN, Oncotype = 20 (RR 6%) [de-identified] : Lorraine comes to establish care at [facility]. She has been on letrozole since 10/19 and is tolerating it well overall. She had previously been on anastrozole for three months but this was discontinued due to severe hot flashes.\par \par HEALTH MAINTENANCE:\par PCP: Celeste Simeon MD\par Mammogram: 1/25/21 BI-RADS 2\par Breast MRI: 07/12/21 showed post lumpectomy and radiation changes in the right breast. No evidence or recurrence.\par Pap smear: 2018 with Devon Jacobs MD\par Bone density DEXA scan:  07/30/20 showed T scores of 1.6 in spine, and -0.6 in femoral neck\par Colonoscopy: 3/2020 with no polyps\par \par

## 2022-02-07 NOTE — ASU DISCHARGE PLAN (ADULT/PEDIATRIC). - SPECIAL INSTRUCTIONS
Impression: Type 2 diabetes mellitus w/ proliferative diabetic retinopathy w/ macular edema, left eye: Y15.6904. Left. Condition: unstable. Vision: vision affected. s.p PPVX, EL for PDR / VH OS 5/11/2021
s/p AV OD 12/27/21, AV OS 9/30/2021, AV OS 02/26/2021, AV OS 1/15/2021. .. OK to start Hydroxychloroquine 11/20/2020 Plan: Discussed diagnosis in detail with patient. Discussed risks of progression. Exam shows decreasing VH, the retina is attached, decreasing edema. OCT OS shows decreasing edema. Based on today's exam, diagnostic studies and review of records, no treatment is required at this time. Will continue to monitor and observe. Recommend a retina f/u in 4 mos. Remove outer dressing tomorrow   Leave steri strips in place  may shower over steri strip s

## 2022-06-21 ENCOUNTER — OUTPATIENT (OUTPATIENT)
Dept: OUTPATIENT SERVICES | Facility: HOSPITAL | Age: 77
LOS: 1 days | Discharge: HOME | End: 2022-06-21

## 2022-06-21 DIAGNOSIS — C50.919 MALIGNANT NEOPLASM OF UNSPECIFIED SITE OF UNSPECIFIED FEMALE BREAST: ICD-10-CM

## 2022-06-21 DIAGNOSIS — Z96.641 PRESENCE OF RIGHT ARTIFICIAL HIP JOINT: Chronic | ICD-10-CM

## 2022-06-21 DIAGNOSIS — Z90.710 ACQUIRED ABSENCE OF BOTH CERVIX AND UTERUS: Chronic | ICD-10-CM

## 2022-06-21 DIAGNOSIS — Z98.890 OTHER SPECIFIED POSTPROCEDURAL STATES: Chronic | ICD-10-CM

## 2022-06-28 ENCOUNTER — APPOINTMENT (OUTPATIENT)
Dept: HEMATOLOGY ONCOLOGY | Facility: CLINIC | Age: 77
End: 2022-06-28

## 2022-06-28 VITALS
SYSTOLIC BLOOD PRESSURE: 122 MMHG | RESPIRATION RATE: 15 BRPM | OXYGEN SATURATION: 95 % | WEIGHT: 150.12 LBS | DIASTOLIC BLOOD PRESSURE: 77 MMHG | BODY MASS INDEX: 27.59 KG/M2 | TEMPERATURE: 97 F | HEART RATE: 80 BPM

## 2022-06-28 PROCEDURE — 99214 OFFICE O/P EST MOD 30 MIN: CPT

## 2022-06-28 RX ORDER — LEVOTHYROXINE SODIUM 0.07 MG/1
75 TABLET ORAL
Qty: 90 | Refills: 0 | Status: ACTIVE | COMMUNITY
Start: 2022-04-24

## 2022-06-28 RX ORDER — AMOXICILLIN 500 MG/1
500 CAPSULE ORAL
Qty: 20 | Refills: 0 | Status: ACTIVE | COMMUNITY
Start: 2022-03-02

## 2022-06-30 NOTE — HISTORY OF PRESENT ILLNESS
[Disease: _____________________] : Disease: [unfilled] [T: ___] : T[unfilled] [N: ___] : N[unfilled] [M: ___] : M[unfilled] [AJCC Stage: ____] : AJCC Stage: [unfilled] [de-identified] : Right breast cancer at age 74\par 01/23/19 Right breast sono guided core bx revealed a 0.7 cm invasive mammary carcinoma with ductal and lobular features, SBR 6/9, ER 80%, UT 60%, HER-2(2+) FISH-\par 02/06/19 MRI of the breast showed the biopsy clip with minimal associated enhancement is noted, likely from the biopsy proven malignancy. Additional indeterminate enhancing nodule in the lateral right breast, for which biopsy was advised. \par 03/01/19 Right mammo localized wide resection revealed a 1.05 cm invasive carcinoma with ductal and lobular features, SBR 6-7/9 with LVI, 0/2 LN involved. LCIS, pleomorphic      type and focal ADH. Right inferior margin, lateral margin, anterior and superior margin revealed focal pleomorphic LCIS and  ALH in inferior, lateral and deep margins. All     margins uninvolved by invasive carcinoma.\par 03/21/19 Oncotype Score of 20 with distant recurrence risk at 9 years with AI or BRAXTON alone is 6%\par 04/19/19 Right breast wide resection revealed LCIS, classic and pleomorphic types. No invasive carcinoma, DCIS or lymphovascular invasion identified. The resection margins are negative for carcinoma. Pleomorphic LCIS is 0.8 mm from the nearest margin (medial).\par 5/2019 Adjuvant radiation with Dr. Rinaldi\par 7/19 - 10/19 Anastrozole discontinued due to severe hot flashes\par 10/19 Letrozole started by Dr. Adhikari\par 10/14/19 Right breast 10 o'clock axis 4 cm FN FNA was negative for malignancy\par  [de-identified] : 1.05 cm invasive carcinoma with ductal and lobular features, SBR 6-7/9, ER 80%, MT 60%, HER-2 ( 2+) FISH (-) with LVI  0/2 SLN, Oncotype = 20 (RR 6%) [de-identified] : Lorraine started letrozole in 10/19 and is tolerating it well aside from hot flashes which occur occasionally at night. She was given gabapentin at the last visit but never took it as the hot flashes have become more tolerable.\par She had an episode of vaginal bleeding in 5/22 and saw a new GYN Dr. Sam Huynh who evaluated her including a sonogram and found no abnormalities.\par \par HEALTH MAINTENANCE:\par PCP: Celeste Simeon MD\par Mammogram: 1/2022  BI-RADS 2 at [facility]\par Breast MRI: 07/12/21 showed post lumpectomy and radiation changes in the right breast. No evidence or recurrence.\par Pap smear: 5/22 negative\par Bone density DEXA scan: 07/30/20 showed T scores of 1.6 in spine, and -0.6 in femoral neck\par Colonoscopy: 3/2020 with no polyps\par \par

## 2022-07-11 NOTE — REASON FOR VISIT
Clinically, heart failure is well compensated without evidence of significant volume overload.  On device interrogation, OptiVol is subthresold, reflective of an optimal fluid status.  Continue present management.       Compensated.  Continue current management.     Controlled.   Liseth.  Followed by Dr. Vega   No further episodes (shocks) since 7/28/14.  She had one NSVT last March.  Continue monitoring.   Normal function and healed incision.  Continue current programming.  No remote, pt refuses Care[facility].  Follow up in 6 months.   None [Initial Visit] : an initial visit for [Artificial Hip Joint] : artificial hip joint [Hip Pain] : hip pain [Knee Pain] : knee pain [Spouse] : spouse

## 2022-10-13 NOTE — ASU PREOP CHECKLIST - HEIGHT IN FEET
"Adam Freeman MD  10/13/2022     5

## 2022-12-13 ENCOUNTER — APPOINTMENT (OUTPATIENT)
Dept: ORTHOPEDIC SURGERY | Facility: CLINIC | Age: 77
End: 2022-12-13
Payer: MEDICARE

## 2022-12-13 ENCOUNTER — NON-APPOINTMENT (OUTPATIENT)
Age: 77
End: 2022-12-13

## 2022-12-13 VITALS
WEIGHT: 151 LBS | DIASTOLIC BLOOD PRESSURE: 84 MMHG | SYSTOLIC BLOOD PRESSURE: 144 MMHG | BODY MASS INDEX: 26.75 KG/M2 | HEART RATE: 77 BPM | HEIGHT: 63 IN

## 2022-12-13 DIAGNOSIS — M25.561 PAIN IN RIGHT KNEE: ICD-10-CM

## 2022-12-13 DIAGNOSIS — M16.12 UNILATERAL PRIMARY OSTEOARTHRITIS, LEFT HIP: ICD-10-CM

## 2022-12-13 PROCEDURE — 99204 OFFICE O/P NEW MOD 45 MIN: CPT | Mod: 25

## 2022-12-13 PROCEDURE — 20610 DRAIN/INJ JOINT/BURSA W/O US: CPT | Mod: LT

## 2022-12-13 PROCEDURE — 73502 X-RAY EXAM HIP UNI 2-3 VIEWS: CPT | Mod: LT

## 2022-12-13 RX ADMIN — METHYLPREDNISOLONE ACETATE 2 MG/ML: 40 INJECTION, SUSPENSION INTRA-ARTICULAR; INTRALESIONAL; INTRAMUSCULAR; INTRASYNOVIAL; SOFT TISSUE at 00:00

## 2022-12-13 RX ADMIN — LIDOCAINE HYDROCHLORIDE 3 %: 10 INJECTION, SOLUTION INFILTRATION; PERINEURAL at 00:00

## 2022-12-14 ENCOUNTER — OUTPATIENT (OUTPATIENT)
Dept: OUTPATIENT SERVICES | Facility: HOSPITAL | Age: 77
LOS: 1 days | Discharge: HOME | End: 2022-12-14

## 2022-12-14 DIAGNOSIS — Z96.641 PRESENCE OF RIGHT ARTIFICIAL HIP JOINT: Chronic | ICD-10-CM

## 2022-12-14 DIAGNOSIS — Z90.710 ACQUIRED ABSENCE OF BOTH CERVIX AND UTERUS: Chronic | ICD-10-CM

## 2022-12-14 DIAGNOSIS — Z98.890 OTHER SPECIFIED POSTPROCEDURAL STATES: Chronic | ICD-10-CM

## 2022-12-14 DIAGNOSIS — C50.919 MALIGNANT NEOPLASM OF UNSPECIFIED SITE OF UNSPECIFIED FEMALE BREAST: ICD-10-CM

## 2022-12-15 RX ORDER — METHYLPRED ACET/NACL,ISO-OS/PF 40 MG/ML
40 VIAL (ML) INJECTION
Qty: 1 | Refills: 0 | Status: COMPLETED | OUTPATIENT
Start: 2022-12-13

## 2022-12-15 RX ORDER — LIDOCAINE HYDROCHLORIDE 10 MG/ML
1 INJECTION, SOLUTION INFILTRATION; PERINEURAL
Refills: 0 | Status: COMPLETED | OUTPATIENT
Start: 2022-12-13

## 2022-12-20 ENCOUNTER — APPOINTMENT (OUTPATIENT)
Dept: HEMATOLOGY ONCOLOGY | Facility: CLINIC | Age: 77
End: 2022-12-20

## 2022-12-20 VITALS
SYSTOLIC BLOOD PRESSURE: 146 MMHG | RESPIRATION RATE: 15 BRPM | TEMPERATURE: 97 F | HEART RATE: 79 BPM | BODY MASS INDEX: 27.34 KG/M2 | OXYGEN SATURATION: 97 % | DIASTOLIC BLOOD PRESSURE: 88 MMHG | WEIGHT: 154.32 LBS

## 2022-12-20 PROCEDURE — 99214 OFFICE O/P EST MOD 30 MIN: CPT

## 2022-12-20 NOTE — PHYSICAL EXAM
[Fully active, able to carry on all pre-disease performance without restriction] : Status 0 - Fully active, able to carry on all pre-disease performance without restriction [Normal] : affect appropriate [de-identified] : Stable seroma in right breast

## 2022-12-20 NOTE — HISTORY OF PRESENT ILLNESS
[Disease: _____________________] : Disease: [unfilled] [T: ___] : T[unfilled] [N: ___] : N[unfilled] [M: ___] : M[unfilled] [AJCC Stage: ____] : AJCC Stage: [unfilled] [de-identified] : Right breast cancer at age 74\par 01/23/19 Right breast sono guided core bx revealed a 0.7 cm invasive mammary carcinoma with ductal and lobular features, SBR 6/9, ER 80%, SC 60%, HER-2(2+) FISH-\par 02/06/19 MRI of the breast showed the biopsy clip with minimal associated enhancement is noted, likely from the biopsy proven malignancy. Additional indeterminate enhancing nodule in the lateral right breast, for which biopsy was advised. \par 03/01/19 Right mammo localized wide resection revealed a 1.05 cm invasive carcinoma with ductal and lobular features, SBR 6-7/9 with LVI, 0/2 LN involved. LCIS, pleomorphic      type and focal ADH. Right inferior margin, lateral margin, anterior and superior margin revealed focal pleomorphic LCIS and  ALH in inferior, lateral and deep margins. All     margins uninvolved by invasive carcinoma.\par 03/21/19 Oncotype Score of 20 with distant recurrence risk at 9 years with AI or BRAXTON alone is 6%\par 04/19/19 Right breast wide resection revealed LCIS, classic and pleomorphic types. No invasive carcinoma, DCIS or lymphovascular invasion identified. The resection margins are negative for carcinoma. Pleomorphic LCIS is 0.8 mm from the nearest margin (medial).\par 5/2019 Adjuvant radiation with Dr. Rinaldi\par 7/19 - 10/19 Anastrozole discontinued due to severe hot flashes\par 10/19 Letrozole started by Dr. Adhikari\par 10/14/19 Right breast 10 o'clock axis fine needle aspiration was negative for malignancy\par  [de-identified] : 1.05 cm invasive carcinoma with ductal and lobular features, SBR 6-7/9, ER 80%, NJ 60%, HER-2 ( 2+) FISH (-) with LVI  0/2 SLN, Oncotype = 20 (RR 6%) [de-identified] : Lorraine started letrozole in 10/19 and continues to tolerate it well aside from hot flashes which occur occasionally at night. \par She had an episode of vaginal bleeding in 5/22 and saw a new GYN Dr. Sam Huynh who evaluated her including a sonogram and found no abnormalities. No recurrent vaginal bleeding since then.\par She is having a left hip replacement on 1/30/23 at [facility]. She had good results from her right hip replacement 16 years ago.\par \par HEALTH MAINTENANCE:\par PCP: Celeste Simeon MD\par Mammogram: 1/2022  BI-RADS 2 at [facility]\par Breast MRI: 07/12/21 showed post lumpectomy and radiation changes in the right breast. No evidence or recurrence.\par Pap smear: 5/22 negative\par Bone density DEXA scan: 07/27/22 showed T scores of 0.8 in spine, -1.5 in femoral neck and -0.6 in forearm\par                                           07/30/20 showed T scores of 1.6 in spine, and -0.6 in femoral neck\par Colonoscopy: 3/2020 with no polyps\par \par

## 2022-12-21 ENCOUNTER — RX RENEWAL (OUTPATIENT)
Age: 77
End: 2022-12-21

## 2023-01-23 ENCOUNTER — OUTPATIENT (OUTPATIENT)
Dept: OUTPATIENT SERVICES | Facility: HOSPITAL | Age: 78
LOS: 1 days | End: 2023-01-23
Payer: COMMERCIAL

## 2023-01-23 VITALS
HEART RATE: 77 BPM | TEMPERATURE: 97 F | RESPIRATION RATE: 14 BRPM | WEIGHT: 154.98 LBS | SYSTOLIC BLOOD PRESSURE: 153 MMHG | OXYGEN SATURATION: 96 % | DIASTOLIC BLOOD PRESSURE: 81 MMHG | HEIGHT: 61.75 IN

## 2023-01-23 DIAGNOSIS — M16.12 UNILATERAL PRIMARY OSTEOARTHRITIS, LEFT HIP: ICD-10-CM

## 2023-01-23 DIAGNOSIS — Z96.641 PRESENCE OF RIGHT ARTIFICIAL HIP JOINT: Chronic | ICD-10-CM

## 2023-01-23 DIAGNOSIS — Z98.890 OTHER SPECIFIED POSTPROCEDURAL STATES: Chronic | ICD-10-CM

## 2023-01-23 DIAGNOSIS — Z90.710 ACQUIRED ABSENCE OF BOTH CERVIX AND UTERUS: Chronic | ICD-10-CM

## 2023-01-23 DIAGNOSIS — Z01.818 ENCOUNTER FOR OTHER PREPROCEDURAL EXAMINATION: ICD-10-CM

## 2023-01-23 LAB
A1C WITH ESTIMATED AVERAGE GLUCOSE RESULT: 8.4 % — HIGH (ref 4–5.6)
ALBUMIN SERPL ELPH-MCNC: 4.1 G/DL — SIGNIFICANT CHANGE UP (ref 3.3–5)
ALP SERPL-CCNC: 78 U/L — SIGNIFICANT CHANGE UP (ref 30–120)
ALT FLD-CCNC: 29 U/L DA — SIGNIFICANT CHANGE UP (ref 10–60)
ANION GAP SERPL CALC-SCNC: 10 MMOL/L — SIGNIFICANT CHANGE UP (ref 5–17)
APTT BLD: 28.8 SEC — SIGNIFICANT CHANGE UP (ref 27.5–35.5)
AST SERPL-CCNC: 16 U/L — SIGNIFICANT CHANGE UP (ref 10–40)
BILIRUB SERPL-MCNC: 0.6 MG/DL — SIGNIFICANT CHANGE UP (ref 0.2–1.2)
BLD GP AB SCN SERPL QL: SIGNIFICANT CHANGE UP
BUN SERPL-MCNC: 30 MG/DL — HIGH (ref 7–23)
CALCIUM SERPL-MCNC: 10.3 MG/DL — SIGNIFICANT CHANGE UP (ref 8.4–10.5)
CHLORIDE SERPL-SCNC: 103 MMOL/L — SIGNIFICANT CHANGE UP (ref 96–108)
CO2 SERPL-SCNC: 31 MMOL/L — SIGNIFICANT CHANGE UP (ref 22–31)
CREAT SERPL-MCNC: 1.32 MG/DL — HIGH (ref 0.5–1.3)
EGFR: 42 ML/MIN/1.73M2 — LOW
ESTIMATED AVERAGE GLUCOSE: 194 MG/DL — HIGH (ref 68–114)
GLUCOSE SERPL-MCNC: 151 MG/DL — HIGH (ref 70–99)
HCT VFR BLD CALC: 48.1 % — HIGH (ref 34.5–45)
HGB BLD-MCNC: 15.7 G/DL — HIGH (ref 11.5–15.5)
INR BLD: 1.03 RATIO — SIGNIFICANT CHANGE UP (ref 0.88–1.16)
MCHC RBC-ENTMCNC: 30.9 PG — SIGNIFICANT CHANGE UP (ref 27–34)
MCHC RBC-ENTMCNC: 32.6 GM/DL — SIGNIFICANT CHANGE UP (ref 32–36)
MCV RBC AUTO: 94.7 FL — SIGNIFICANT CHANGE UP (ref 80–100)
NRBC # BLD: 0 /100 WBCS — SIGNIFICANT CHANGE UP (ref 0–0)
PLATELET # BLD AUTO: 210 K/UL — SIGNIFICANT CHANGE UP (ref 150–400)
POTASSIUM SERPL-MCNC: 4.4 MMOL/L — SIGNIFICANT CHANGE UP (ref 3.5–5.3)
POTASSIUM SERPL-SCNC: 4.4 MMOL/L — SIGNIFICANT CHANGE UP (ref 3.5–5.3)
PROT SERPL-MCNC: 7.5 G/DL — SIGNIFICANT CHANGE UP (ref 6–8.3)
PROTHROM AB SERPL-ACNC: 11.8 SEC — SIGNIFICANT CHANGE UP (ref 10.5–13.4)
RBC # BLD: 5.08 M/UL — SIGNIFICANT CHANGE UP (ref 3.8–5.2)
RBC # FLD: 14.5 % — SIGNIFICANT CHANGE UP (ref 10.3–14.5)
SODIUM SERPL-SCNC: 144 MMOL/L — SIGNIFICANT CHANGE UP (ref 135–145)
WBC # BLD: 7.54 K/UL — SIGNIFICANT CHANGE UP (ref 3.8–10.5)
WBC # FLD AUTO: 7.54 K/UL — SIGNIFICANT CHANGE UP (ref 3.8–10.5)

## 2023-01-23 PROCEDURE — G0463: CPT

## 2023-01-23 RX ORDER — GLIMEPIRIDE 1 MG
1 TABLET ORAL
Qty: 0 | Refills: 0 | DISCHARGE

## 2023-01-23 RX ORDER — LEVOTHYROXINE SODIUM 125 MCG
1 TABLET ORAL
Qty: 0 | Refills: 0 | DISCHARGE

## 2023-01-23 RX ORDER — ASCORBIC ACID 60 MG
1 TABLET,CHEWABLE ORAL
Qty: 0 | Refills: 0 | DISCHARGE

## 2023-01-23 RX ORDER — LANOLIN ALCOHOL/MO/W.PET/CERES
1 CREAM (GRAM) TOPICAL
Qty: 0 | Refills: 0 | DISCHARGE

## 2023-01-23 RX ORDER — ALLOPURINOL 300 MG
1 TABLET ORAL
Qty: 0 | Refills: 0 | DISCHARGE

## 2023-01-23 RX ORDER — GLUCOSAMINE HCL/CHONDROITIN SU 500-400 MG
0 CAPSULE ORAL
Qty: 0 | Refills: 0 | DISCHARGE

## 2023-01-23 RX ORDER — CINNAMON BARK 500 MG
2 CAPSULE ORAL
Qty: 0 | Refills: 0 | DISCHARGE

## 2023-01-23 RX ORDER — CHOLECALCIFEROL (VITAMIN D3) 125 MCG
1 CAPSULE ORAL
Qty: 0 | Refills: 0 | DISCHARGE

## 2023-01-23 NOTE — H&P PST ADULT - NSANTHOSAYNRD_GEN_A_CORE
Needs appointment and lab prior to additional refills of spironolactone.   neck 15.5 inches/No. SARINA screening performed.  STOP BANG Legend: 0-2 = LOW Risk; 3-4 = INTERMEDIATE Risk; 5-8 = HIGH Risk

## 2023-01-23 NOTE — H&P PST ADULT - RESPIRATORY
normal/clear to auscultation bilaterally/no wheezes/no rales/no respiratory distress/airway patent/good air movement/respirations non-labored

## 2023-01-23 NOTE — H&P PST ADULT - PROBLEM SELECTOR PLAN 1
left anterior THR. medical clearance requested. accucheck on admission. ERAS and surgical wash instructions reviewed and verbalized understanding. instructed to stop brittani-red, MVI, CoQ10 and move free 1 week prior to surgery and hold glimeperide day of surgery. instructed to take levothyroxine and allopuinol AM of surgery with sips of water. patient was told by oncologist not to hold letrozole. covid PCR appt. confirmed for 1/27/23 at 1200

## 2023-01-23 NOTE — H&P PST ADULT - MUSCULOSKELETAL
left hip/no joint swelling/no joint erythema/no joint warmth/no calf tenderness/decreased ROM/decreased ROM due to pain/strength 5/5 bilateral upper extremities/decreased strength/abnormal gait details…

## 2023-01-23 NOTE — H&P PST ADULT - NSICDXPASTMEDICALHX_GEN_ALL_CORE_FT
PAST MEDICAL HISTORY:  COVID-19 vaccine series completed     Essential hypertension     Gout     History of lumpectomy of right breast     Hyperlipidemia     Hypothyroidism     Malignant neoplasm of upper-outer quadrant of right female breast, unspecified estrogen receptor status     Osteoarthritis     Prolapsed uterus     Type 2 diabetes mellitus     Urge incontinence

## 2023-01-23 NOTE — H&P PST ADULT - NSICDXPROCEDURE_GEN_ALL_CORE_FT
PROCEDURES:  Total replacement of left hip joint by anterior approach 23-Jan-2023 11:56:20  Benita Horta

## 2023-01-23 NOTE — H&P PST ADULT - BP NONINVASIVE MEAN (MM HG)
THAT IS CORRECT  THEY WERE JUST DUPPLICATES ON THE CHART    SHE SHOULD CONTINUE TO TAKE HER ROUTINE MEDICATIONS    THANKS 105

## 2023-01-23 NOTE — H&P PST ADULT - HISTORY OF PRESENT ILLNESS
76 yo female presents with 1 year history of left hip and groin pain which has worsened over the last 6 months. Denies receiving any prior treatment. Pain now 0-1/10 at rest and increased to 9-10/10 with activity. Pain relieved with aleve.

## 2023-01-23 NOTE — H&P PST ADULT - NSICDXPASTSURGICALHX_GEN_ALL_CORE_FT
PAST SURGICAL HISTORY:  History of hip replacement, total, right 2007    History of right breast biopsy 3/1/19    Status post hysterectomy Partial 1997

## 2023-01-24 LAB
MRSA PCR RESULT.: SIGNIFICANT CHANGE UP
S AUREUS DNA NOSE QL NAA+PROBE: SIGNIFICANT CHANGE UP

## 2023-01-26 RX ORDER — TRANEXAMIC ACID 100 MG/ML
1000 INJECTION, SOLUTION INTRAVENOUS ONCE
Refills: 0 | Status: COMPLETED | OUTPATIENT
Start: 2023-01-30 | End: 2023-01-30

## 2023-01-26 RX ORDER — APREPITANT 80 MG/1
40 CAPSULE ORAL ONCE
Refills: 0 | Status: COMPLETED | OUTPATIENT
Start: 2023-01-30 | End: 2023-01-30

## 2023-01-26 RX ORDER — CEFAZOLIN SODIUM 1 G
2000 VIAL (EA) INJECTION ONCE
Refills: 0 | Status: COMPLETED | OUTPATIENT
Start: 2023-01-30 | End: 2023-01-30

## 2023-01-26 RX ORDER — CHLORHEXIDINE GLUCONATE 213 G/1000ML
1 SOLUTION TOPICAL ONCE
Refills: 0 | Status: COMPLETED | OUTPATIENT
Start: 2023-01-30 | End: 2023-01-30

## 2023-01-26 RX ORDER — ACETAMINOPHEN 500 MG
1000 TABLET ORAL ONCE
Refills: 0 | Status: COMPLETED | OUTPATIENT
Start: 2023-01-30 | End: 2023-01-30

## 2023-01-27 ENCOUNTER — OUTPATIENT (OUTPATIENT)
Dept: OUTPATIENT SERVICES | Facility: HOSPITAL | Age: 78
LOS: 1 days | End: 2023-01-27
Payer: COMMERCIAL

## 2023-01-27 DIAGNOSIS — Z96.641 PRESENCE OF RIGHT ARTIFICIAL HIP JOINT: Chronic | ICD-10-CM

## 2023-01-27 DIAGNOSIS — Z90.710 ACQUIRED ABSENCE OF BOTH CERVIX AND UTERUS: Chronic | ICD-10-CM

## 2023-01-27 DIAGNOSIS — Z20.828 CONTACT WITH AND (SUSPECTED) EXPOSURE TO OTHER VIRAL COMMUNICABLE DISEASES: ICD-10-CM

## 2023-01-27 DIAGNOSIS — Z98.890 OTHER SPECIFIED POSTPROCEDURAL STATES: Chronic | ICD-10-CM

## 2023-01-27 LAB — SARS-COV-2 RNA SPEC QL NAA+PROBE: SIGNIFICANT CHANGE UP

## 2023-01-27 PROCEDURE — U0005: CPT

## 2023-01-27 PROCEDURE — U0003: CPT

## 2023-01-30 ENCOUNTER — TRANSCRIPTION ENCOUNTER (OUTPATIENT)
Age: 78
End: 2023-01-30

## 2023-01-30 ENCOUNTER — APPOINTMENT (OUTPATIENT)
Dept: ORTHOPEDIC SURGERY | Facility: HOSPITAL | Age: 78
End: 2023-01-30

## 2023-01-30 ENCOUNTER — INPATIENT (INPATIENT)
Facility: HOSPITAL | Age: 78
LOS: 1 days | Discharge: HOME | DRG: 470 | End: 2023-02-01
Attending: ORTHOPAEDIC SURGERY | Admitting: ORTHOPAEDIC SURGERY
Payer: COMMERCIAL

## 2023-01-30 VITALS
RESPIRATION RATE: 20 BRPM | TEMPERATURE: 98 F | OXYGEN SATURATION: 99 % | HEIGHT: 62 IN | DIASTOLIC BLOOD PRESSURE: 75 MMHG | WEIGHT: 151.46 LBS | HEART RATE: 81 BPM | SYSTOLIC BLOOD PRESSURE: 150 MMHG

## 2023-01-30 DIAGNOSIS — Z90.710 ACQUIRED ABSENCE OF BOTH CERVIX AND UTERUS: Chronic | ICD-10-CM

## 2023-01-30 DIAGNOSIS — Z98.890 OTHER SPECIFIED POSTPROCEDURAL STATES: Chronic | ICD-10-CM

## 2023-01-30 DIAGNOSIS — E11.9 TYPE 2 DIABETES MELLITUS WITHOUT COMPLICATIONS: ICD-10-CM

## 2023-01-30 DIAGNOSIS — M16.12 UNILATERAL PRIMARY OSTEOARTHRITIS, LEFT HIP: ICD-10-CM

## 2023-01-30 DIAGNOSIS — Z96.641 PRESENCE OF RIGHT ARTIFICIAL HIP JOINT: Chronic | ICD-10-CM

## 2023-01-30 PROBLEM — Z92.29 PERSONAL HISTORY OF OTHER DRUG THERAPY: Chronic | Status: ACTIVE | Noted: 2023-01-23

## 2023-01-30 PROBLEM — E78.5 HYPERLIPIDEMIA, UNSPECIFIED: Chronic | Status: ACTIVE | Noted: 2023-01-23

## 2023-01-30 PROBLEM — M19.90 UNSPECIFIED OSTEOARTHRITIS, UNSPECIFIED SITE: Chronic | Status: ACTIVE | Noted: 2023-01-23

## 2023-01-30 PROBLEM — N39.41 URGE INCONTINENCE: Chronic | Status: ACTIVE | Noted: 2023-01-23

## 2023-01-30 LAB
ABO RH CONFIRMATION: SIGNIFICANT CHANGE UP
ANION GAP SERPL CALC-SCNC: 8 MMOL/L — SIGNIFICANT CHANGE UP (ref 5–17)
BUN SERPL-MCNC: 31 MG/DL — HIGH (ref 7–23)
CALCIUM SERPL-MCNC: 8.3 MG/DL — LOW (ref 8.4–10.5)
CHLORIDE SERPL-SCNC: 99 MMOL/L — SIGNIFICANT CHANGE UP (ref 96–108)
CO2 SERPL-SCNC: 27 MMOL/L — SIGNIFICANT CHANGE UP (ref 22–31)
CREAT SERPL-MCNC: 1.68 MG/DL — HIGH (ref 0.5–1.3)
EGFR: 32 ML/MIN/1.73M2 — LOW
GLUCOSE BLDC GLUCOMTR-MCNC: 165 MG/DL — HIGH (ref 70–99)
GLUCOSE BLDC GLUCOMTR-MCNC: 170 MG/DL — HIGH (ref 70–99)
GLUCOSE BLDC GLUCOMTR-MCNC: 370 MG/DL — HIGH (ref 70–99)
GLUCOSE SERPL-MCNC: 362 MG/DL — HIGH (ref 70–99)
HCT VFR BLD CALC: 39.1 % — SIGNIFICANT CHANGE UP (ref 34.5–45)
HGB BLD-MCNC: 13 G/DL — SIGNIFICANT CHANGE UP (ref 11.5–15.5)
MCHC RBC-ENTMCNC: 31.3 PG — SIGNIFICANT CHANGE UP (ref 27–34)
MCHC RBC-ENTMCNC: 33.2 GM/DL — SIGNIFICANT CHANGE UP (ref 32–36)
MCV RBC AUTO: 94.2 FL — SIGNIFICANT CHANGE UP (ref 80–100)
NRBC # BLD: 0 /100 WBCS — SIGNIFICANT CHANGE UP (ref 0–0)
PLATELET # BLD AUTO: 169 K/UL — SIGNIFICANT CHANGE UP (ref 150–400)
POTASSIUM SERPL-MCNC: 4.2 MMOL/L — SIGNIFICANT CHANGE UP (ref 3.5–5.3)
POTASSIUM SERPL-SCNC: 4.2 MMOL/L — SIGNIFICANT CHANGE UP (ref 3.5–5.3)
RBC # BLD: 4.15 M/UL — SIGNIFICANT CHANGE UP (ref 3.8–5.2)
RBC # FLD: 14.5 % — SIGNIFICANT CHANGE UP (ref 10.3–14.5)
SODIUM SERPL-SCNC: 134 MMOL/L — LOW (ref 135–145)
WBC # BLD: 13.36 K/UL — HIGH (ref 3.8–10.5)
WBC # FLD AUTO: 13.36 K/UL — HIGH (ref 3.8–10.5)

## 2023-01-30 DEVICE — K-WIRE MICROAIRE (THREADED) SINGLE TROCAR 4.8MM X 9": Type: IMPLANTABLE DEVICE | Status: FUNCTIONAL

## 2023-01-30 DEVICE — SCREW ACET SZ 6.5X30MM: Type: IMPLANTABLE DEVICE | Status: FUNCTIONAL

## 2023-01-30 DEVICE — SLEEVE BIOLOX DELTA OPTION SZ -3: Type: IMPLANTABLE DEVICE | Status: FUNCTIONAL

## 2023-01-30 DEVICE — STEM FEM HIP OFFSET LAT SZ 8: Type: IMPLANTABLE DEVICE | Status: FUNCTIONAL

## 2023-01-30 DEVICE — BEARING EMPWR DUAL MOBILITY 32 ALPHA D: Type: IMPLANTABLE DEVICE | Status: FUNCTIONAL

## 2023-01-30 DEVICE — FEM HD BIOLOX DELTA 28MM: Type: IMPLANTABLE DEVICE | Status: FUNCTIONAL

## 2023-01-30 DEVICE — BONE WAX 2.5GM: Type: IMPLANTABLE DEVICE | Status: FUNCTIONAL

## 2023-01-30 DEVICE — LINER ACET EMPWR METAL DUAL MOBILITY 38 ALPHA D: Type: IMPLANTABLE DEVICE | Status: FUNCTIONAL

## 2023-01-30 DEVICE — CABLE CABLE-RDY PLT TROCH 1.8X635: Type: IMPLANTABLE DEVICE | Status: FUNCTIONAL

## 2023-01-30 DEVICE — CUP ACET EMPOWR CLUSTER 48MM ALPHA D: Type: IMPLANTABLE DEVICE | Status: FUNCTIONAL

## 2023-01-30 RX ORDER — UBIDECARENONE 100 MG
1 CAPSULE ORAL
Qty: 0 | Refills: 0 | DISCHARGE

## 2023-01-30 RX ORDER — LETROZOLE 2.5 MG/1
1 TABLET, FILM COATED ORAL
Qty: 0 | Refills: 0 | DISCHARGE

## 2023-01-30 RX ORDER — LETROZOLE 2.5 MG/1
2.5 TABLET, FILM COATED ORAL DAILY
Refills: 0 | Status: DISCONTINUED | OUTPATIENT
Start: 2023-01-30 | End: 2023-02-01

## 2023-01-30 RX ORDER — ALLOPURINOL 300 MG
300 TABLET ORAL AT BEDTIME
Refills: 0 | Status: DISCONTINUED | OUTPATIENT
Start: 2023-01-30 | End: 2023-02-01

## 2023-01-30 RX ORDER — MAGNESIUM HYDROXIDE 400 MG/1
30 TABLET, CHEWABLE ORAL DAILY
Refills: 0 | Status: DISCONTINUED | OUTPATIENT
Start: 2023-01-30 | End: 2023-02-01

## 2023-01-30 RX ORDER — RAMIPRIL 5 MG
1 CAPSULE ORAL
Qty: 0 | Refills: 0 | DISCHARGE

## 2023-01-30 RX ORDER — LANOLIN ALCOHOL/MO/W.PET/CERES
1 CREAM (GRAM) TOPICAL
Qty: 0 | Refills: 0 | DISCHARGE

## 2023-01-30 RX ORDER — GLIMEPIRIDE 1 MG
1 TABLET ORAL
Qty: 0 | Refills: 0 | DISCHARGE

## 2023-01-30 RX ORDER — DEXTROSE 50 % IN WATER 50 %
12.5 SYRINGE (ML) INTRAVENOUS ONCE
Refills: 0 | Status: DISCONTINUED | OUTPATIENT
Start: 2023-01-30 | End: 2023-02-01

## 2023-01-30 RX ORDER — OXYCODONE HYDROCHLORIDE 5 MG/1
5 TABLET ORAL
Refills: 0 | Status: DISCONTINUED | OUTPATIENT
Start: 2023-01-30 | End: 2023-02-01

## 2023-01-30 RX ORDER — LEVOTHYROXINE SODIUM 125 MCG
1 TABLET ORAL
Qty: 0 | Refills: 0 | DISCHARGE

## 2023-01-30 RX ORDER — POLYETHYLENE GLYCOL 3350 17 G/17G
17 POWDER, FOR SOLUTION ORAL AT BEDTIME
Refills: 0 | Status: DISCONTINUED | OUTPATIENT
Start: 2023-01-30 | End: 2023-02-01

## 2023-01-30 RX ORDER — ALLOPURINOL 300 MG
1 TABLET ORAL
Qty: 0 | Refills: 0 | DISCHARGE

## 2023-01-30 RX ORDER — SODIUM CHLORIDE 9 MG/ML
1000 INJECTION, SOLUTION INTRAVENOUS
Refills: 0 | Status: DISCONTINUED | OUTPATIENT
Start: 2023-01-30 | End: 2023-02-01

## 2023-01-30 RX ORDER — ONDANSETRON 8 MG/1
4 TABLET, FILM COATED ORAL ONCE
Refills: 0 | Status: DISCONTINUED | OUTPATIENT
Start: 2023-01-30 | End: 2023-01-30

## 2023-01-30 RX ORDER — ONDANSETRON 8 MG/1
4 TABLET, FILM COATED ORAL EVERY 6 HOURS
Refills: 0 | Status: DISCONTINUED | OUTPATIENT
Start: 2023-01-30 | End: 2023-02-01

## 2023-01-30 RX ORDER — HYDROMORPHONE HYDROCHLORIDE 2 MG/ML
0.5 INJECTION INTRAMUSCULAR; INTRAVENOUS; SUBCUTANEOUS
Refills: 0 | Status: DISCONTINUED | OUTPATIENT
Start: 2023-01-30 | End: 2023-01-30

## 2023-01-30 RX ORDER — SODIUM CHLORIDE 9 MG/ML
500 INJECTION INTRAMUSCULAR; INTRAVENOUS; SUBCUTANEOUS ONCE
Refills: 0 | Status: COMPLETED | OUTPATIENT
Start: 2023-01-30 | End: 2023-01-30

## 2023-01-30 RX ORDER — DEXTROSE 50 % IN WATER 50 %
25 SYRINGE (ML) INTRAVENOUS ONCE
Refills: 0 | Status: DISCONTINUED | OUTPATIENT
Start: 2023-01-30 | End: 2023-02-01

## 2023-01-30 RX ORDER — LISINOPRIL 2.5 MG/1
10 TABLET ORAL DAILY
Refills: 0 | Status: DISCONTINUED | OUTPATIENT
Start: 2023-02-01 | End: 2023-02-01

## 2023-01-30 RX ORDER — SODIUM CHLORIDE 9 MG/ML
1000 INJECTION, SOLUTION INTRAVENOUS
Refills: 0 | Status: DISCONTINUED | OUTPATIENT
Start: 2023-01-30 | End: 2023-01-30

## 2023-01-30 RX ORDER — ATORVASTATIN CALCIUM 80 MG/1
10 TABLET, FILM COATED ORAL AT BEDTIME
Refills: 0 | Status: DISCONTINUED | OUTPATIENT
Start: 2023-01-30 | End: 2023-02-01

## 2023-01-30 RX ORDER — OXYCODONE HYDROCHLORIDE 5 MG/1
10 TABLET ORAL
Refills: 0 | Status: DISCONTINUED | OUTPATIENT
Start: 2023-01-30 | End: 2023-02-01

## 2023-01-30 RX ORDER — INSULIN LISPRO 100/ML
3 VIAL (ML) SUBCUTANEOUS ONCE
Refills: 0 | Status: COMPLETED | OUTPATIENT
Start: 2023-01-30 | End: 2023-01-30

## 2023-01-30 RX ORDER — INDAPAMIDE 1.25 MG
1 TABLET ORAL
Qty: 0 | Refills: 0 | DISCHARGE

## 2023-01-30 RX ORDER — OXYCODONE HYDROCHLORIDE 5 MG/1
5 TABLET ORAL ONCE
Refills: 0 | Status: DISCONTINUED | OUTPATIENT
Start: 2023-01-30 | End: 2023-01-30

## 2023-01-30 RX ORDER — OMEGA-3 ACID ETHYL ESTERS 1 G
1 CAPSULE ORAL
Qty: 0 | Refills: 0 | DISCHARGE

## 2023-01-30 RX ORDER — LEVOTHYROXINE SODIUM 125 MCG
75 TABLET ORAL DAILY
Refills: 0 | Status: DISCONTINUED | OUTPATIENT
Start: 2023-01-30 | End: 2023-02-01

## 2023-01-30 RX ORDER — SODIUM CHLORIDE 9 MG/ML
1000 INJECTION, SOLUTION INTRAVENOUS
Refills: 0 | Status: DISCONTINUED | OUTPATIENT
Start: 2023-01-31 | End: 2023-01-31

## 2023-01-30 RX ORDER — ACETAMINOPHEN 500 MG
1000 TABLET ORAL ONCE
Refills: 0 | Status: COMPLETED | OUTPATIENT
Start: 2023-01-31 | End: 2023-01-31

## 2023-01-30 RX ORDER — SENNA PLUS 8.6 MG/1
2 TABLET ORAL AT BEDTIME
Refills: 0 | Status: DISCONTINUED | OUTPATIENT
Start: 2023-01-30 | End: 2023-02-01

## 2023-01-30 RX ORDER — GLUCAGON INJECTION, SOLUTION 0.5 MG/.1ML
1 INJECTION, SOLUTION SUBCUTANEOUS ONCE
Refills: 0 | Status: DISCONTINUED | OUTPATIENT
Start: 2023-01-30 | End: 2023-02-01

## 2023-01-30 RX ORDER — ACETAMINOPHEN 500 MG
1000 TABLET ORAL EVERY 8 HOURS
Refills: 0 | Status: DISCONTINUED | OUTPATIENT
Start: 2023-01-31 | End: 2023-02-01

## 2023-01-30 RX ORDER — PANTOPRAZOLE SODIUM 20 MG/1
40 TABLET, DELAYED RELEASE ORAL
Refills: 0 | Status: DISCONTINUED | OUTPATIENT
Start: 2023-01-30 | End: 2023-02-01

## 2023-01-30 RX ORDER — HYDROMORPHONE HYDROCHLORIDE 2 MG/ML
0.5 INJECTION INTRAMUSCULAR; INTRAVENOUS; SUBCUTANEOUS
Refills: 0 | Status: DISCONTINUED | OUTPATIENT
Start: 2023-01-30 | End: 2023-02-01

## 2023-01-30 RX ORDER — DEXTROSE 50 % IN WATER 50 %
15 SYRINGE (ML) INTRAVENOUS ONCE
Refills: 0 | Status: DISCONTINUED | OUTPATIENT
Start: 2023-01-30 | End: 2023-02-01

## 2023-01-30 RX ORDER — INSULIN LISPRO 100/ML
VIAL (ML) SUBCUTANEOUS
Refills: 0 | Status: DISCONTINUED | OUTPATIENT
Start: 2023-01-30 | End: 2023-01-31

## 2023-01-30 RX ORDER — MELOXICAM 15 MG/1
15 TABLET ORAL DAILY
Refills: 0 | Status: DISCONTINUED | OUTPATIENT
Start: 2023-01-31 | End: 2023-01-31

## 2023-01-30 RX ORDER — APIXABAN 2.5 MG/1
2.5 TABLET, FILM COATED ORAL
Refills: 0 | Status: COMPLETED | OUTPATIENT
Start: 2023-01-31 | End: 2023-01-31

## 2023-01-30 RX ORDER — ACETAMINOPHEN 500 MG
1000 TABLET ORAL ONCE
Refills: 0 | Status: COMPLETED | OUTPATIENT
Start: 2023-01-30 | End: 2023-01-30

## 2023-01-30 RX ORDER — APIXABAN 2.5 MG/1
2.5 TABLET, FILM COATED ORAL EVERY 12 HOURS
Refills: 0 | Status: DISCONTINUED | OUTPATIENT
Start: 2023-02-01 | End: 2023-02-01

## 2023-01-30 RX ORDER — DEXAMETHASONE 0.5 MG/5ML
8 ELIXIR ORAL ONCE
Refills: 0 | Status: COMPLETED | OUTPATIENT
Start: 2023-01-31 | End: 2023-01-31

## 2023-01-30 RX ORDER — CEFAZOLIN SODIUM 1 G
2000 VIAL (EA) INJECTION EVERY 8 HOURS
Refills: 0 | Status: COMPLETED | OUTPATIENT
Start: 2023-01-30 | End: 2023-01-31

## 2023-01-30 RX ADMIN — APREPITANT 40 MILLIGRAM(S): 80 CAPSULE ORAL at 11:24

## 2023-01-30 RX ADMIN — POLYETHYLENE GLYCOL 3350 17 GRAM(S): 17 POWDER, FOR SOLUTION ORAL at 22:34

## 2023-01-30 RX ADMIN — CHLORHEXIDINE GLUCONATE 1 APPLICATION(S): 213 SOLUTION TOPICAL at 11:24

## 2023-01-30 RX ADMIN — SODIUM CHLORIDE 75 MILLILITER(S): 9 INJECTION, SOLUTION INTRAVENOUS at 16:31

## 2023-01-30 RX ADMIN — Medication 100 MILLIGRAM(S): at 21:11

## 2023-01-30 RX ADMIN — Medication 3 UNIT(S): at 22:38

## 2023-01-30 RX ADMIN — Medication 1: at 18:19

## 2023-01-30 RX ADMIN — SODIUM CHLORIDE 75 MILLILITER(S): 9 INJECTION, SOLUTION INTRAVENOUS at 18:11

## 2023-01-30 RX ADMIN — SODIUM CHLORIDE 500 MILLILITER(S): 9 INJECTION INTRAMUSCULAR; INTRAVENOUS; SUBCUTANEOUS at 18:11

## 2023-01-30 RX ADMIN — Medication 400 MILLIGRAM(S): at 19:16

## 2023-01-30 RX ADMIN — SENNA PLUS 2 TABLET(S): 8.6 TABLET ORAL at 21:15

## 2023-01-30 RX ADMIN — Medication 1000 MILLIGRAM(S): at 19:44

## 2023-01-30 RX ADMIN — Medication 300 MILLIGRAM(S): at 21:14

## 2023-01-30 RX ADMIN — SODIUM CHLORIDE 500 MILLILITER(S): 9 INJECTION INTRAMUSCULAR; INTRAVENOUS; SUBCUTANEOUS at 17:16

## 2023-01-30 NOTE — DISCHARGE NOTE PROVIDER - DISCHARGE SERVICE FOR PATIENT
on the discharge service for the patient. I have reviewed and made amendments to the documentation where necessary. medical evaluation

## 2023-01-30 NOTE — DISCHARGE NOTE PROVIDER - CARE PROVIDER_API CALL
Guille Arthur)  Orthopedics  [address], [address]  [place] [address]  Phone: [phone]  Fax: [phone]  Scheduled Appointment: 02/16/2023 10:00 AM

## 2023-01-30 NOTE — DISCHARGE NOTE PROVIDER - NSDCFUADDINST_GEN_ALL_CORE_FT
For Constipation :   • Increase your water intake. Drink at least 8 glasses of water daily.  • Try adding fiber to your diet by eating fruits, vegetables and foods that are rich in grains.  • If you do experience constipation, you may take an over-the-counter stool softener/laxative such as Barbara Colace, Senekot or Milk of Magnesia.  - Call your doctor if you experience:  • An increase in pain not controlled by pain medication or change in activity or  position.  • Temperature greater than 101° F.  • Redness, increased swelling or foul smelling drainage from or around the  incision.  • Numbness, tingling or a change in color or temperature of the operative leg.  • Call your doctor immediately if you experience chest pain, shortness of breath or calf pain.  - Call your doctor if you experience:  • An increase in pain not controlled by pain medication or change in activity or  position.  • Temperature greater than 101° F.  • Redness, increased swelling or foul smelling drainage from or around the  incision.  • Numbness, tingling or a change in color or temperature of the operative leg.  • Call your doctor immediately if you experience chest pain, shortness of breath or calf pain.

## 2023-01-30 NOTE — DISCHARGE NOTE PROVIDER - NSDCCPTREATMENT_GEN_ALL_CORE_FT
PRINCIPAL PROCEDURE  Procedure: Total replacement of left hip joint by anterior approach  Findings and Treatment:        PRINCIPAL PROCEDURE  Procedure: Total replacement of left hip joint by anterior approach  Findings and Treatment: osteoarthritis left hip

## 2023-01-30 NOTE — PHYSICAL THERAPY INITIAL EVALUATION ADULT - FUNCTIONAL LIMITATIONS, PT EVAL
"Chief Complaint   Patient presents with     Wart       Initial BP 92/58 (Cuff Size: Child)  Pulse 74  Temp 97.7  F (36.5  C) (Tympanic)  Ht 4' 9.25\" (1.454 m)  Wt 71 lb 14.4 oz (32.6 kg)  SpO2 99%  BMI 15.42 kg/m2 Estimated body mass index is 15.42 kg/(m^2) as calculated from the following:    Height as of this encounter: 4' 9.25\" (1.454 m).    Weight as of this encounter: 71 lb 14.4 oz (32.6 kg).  Medication Reconciliation: complete   Beth Alvarez CMA    " self-care/home management

## 2023-01-30 NOTE — DISCHARGE NOTE PROVIDER - HOSPITAL COURSE
This patient was admitted to [facility] with a history of severe degenerative joint disease of the left hip.  Patient went to Pre-Surgical Testing at [facility] and was medically cleared to undergo a left anterior total hip replacement by Dr. Arthur.  No operative or ariana-operative complications arose during patients hospital course.  Patient received antibiotic according to SCIP guidelines for infection prevention.  Eliquis was given for DVT prophylaxis.  Anesthesia, Medical Hospitalist, Physical Therapy and Occupational Therapy were consulted. Patient is stable for discharge with a good prognosis.  Appropriate discharge instructions and medications are provided in this document. This patient was admitted to [facility] with a history of severe degenerative joint disease of the left hip.  Patient went to Pre-Surgical Testing at [facility] and was medically cleared to undergo a left anterior total hip replacement by Dr. Arthur.  No operative or ariana-operative complications arose during patients hospital course.  Patient received antibiotic according to SCIP guidelines for infection prevention.  Eliquis 2.5 mg every 12 hrs, along w bilat venodynes, was given for DVT prophylaxis (caprini 12).  Anesthesia, Medical Hospitalist, Physical Therapy and Occupational Therapy were consulted. Patient is stable for discharge with a good prognosis.  Appropriate discharge instructions and medications are provided in this document. The patient is a 77  y.o. female  with severe osteoarthritis of the left hip.  She was evaluated by the PST dept at [facility] and obtained the appropriate medical clearances.  After admission on 1/30/23 and receiving pre-operative parenteral prophylactic antibiotics (ancef), the patient  underwent an  uncomplicated left anterior STAN by orthopedic surgeon Dr. Guille Arthur.    A medical consultation from the Hospitalist service was obtained for post-operative medical co-management. Typical Physical & occupational therapy modalities post anterior STAN were performed including ambulation training, range of motion, ADL's, and transfers. Eliquis 2.5 mg every 12 hrs, along w/ bilat venodynes was given for DVT prophylaxis (caprini 12).  She stayed an extras day due to elevation of creatinine (hx elvevated creatinine) , which wad treated w/ IV fluids and discontinuing meloxicam.  The patient had a clean appearing surgical incision with no sign of surgical site infections and had a stable neuro / vascular exam of the operated extremity.  After progression of mobility guided by the PT/ OT staff,  the patient was felt to benefit from further rehabilitative care for restoration to level of function. This was felt to best be accomplished at  home.  Discharge and Orthopedic Care instructions were delineated in the Discharge Plan and reviewed with the patient. All medications were delineated in the medication reconciliation tool and key points were reviewed with the patient. They were deemed stable from an Orthopedic & medical standpoint for discharge today.  Upon completion of Home care she will be  following up with Dr. Arthur for office  follow up Orthopedic care.

## 2023-01-30 NOTE — CONSULT NOTE ADULT - ASSESSMENT
Physical Exam:   Vital Signs Last 24 Hrs  T(C): 36.4 (30 Jan 2023 15:05), Max: 36.8 (30 Jan 2023 11:34)  T(F): 97.6 (30 Jan 2023 15:05), Max: 98.2 (30 Jan 2023 11:34)  HR: 62 (30 Jan 2023 16:45) (62 - 81)  BP: 107/51 (30 Jan 2023 16:45) (100/73 - 150/75)  BP(mean): --  RR: 16 (30 Jan 2023 16:45) (15 - 20)  SpO2: 100% (30 Jan 2023 16:45) (99% - 100%)    Parameters below as of 30 Jan 2023 16:45  Patient On (Oxygen Delivery Method): nasal cannula  O2 Flow (L/min): 2         CAPILLARY BLOOD GLUCOSE      POCT Blood Glucose.: 165 mg/dL (30 Jan 2023 11:17)      Cholesterol, Serum: 113 mg/dL (05.19.21 @ 08:36)     HDL Cholesterol, Serum: 22 mg/dL (05.19.21 @ 08:36)     LDL Cholesterol Calculated: 66 mg/dL (05.19.21 @ 08:36)

## 2023-01-30 NOTE — CONSULT NOTE ADULT - PROBLEM SELECTOR RECOMMENDATION 9
Type 2 A1c 8.6% adm primary OA of L hip  start CC diet when able  Accucheck ACHS  FATOU ACHS  Recommend endocrine-Perlman onconsult  FU appt: TBA Dr. Celeste Simeon May 2023  DSC recommendations: return to home regimen with lifestyle modifications  diabetes education provided as documented above  Diabetes support info and cell # [phone] given   Goal 100-180 mg/dL; 140-180 mg/dL in critical care areas

## 2023-01-30 NOTE — CONSULT NOTE ADULT - ASSESSMENT
POD#0 s/p LEFT THR  - Pain control  - Bowel regimen  - PT/OT  - Incentive spirometry  - VTE PPx - Eliquis    Type 2 DM  - A1c 8.4  - Diabetes NP consult reviewed - patient not interested in traditional BGM, wants to talk to PCP about CGM  - hold glimepiride  - medium dose sliding scale AC and HS    HTN/HLD  - resume statin on POD#2  - resume ramipril on POD#2  - resume indapamide on POD#2    Hx of BrCA on adjuvant therapy with aromatase inhibitor  - resume letrozole    POD#0 s/p LEFT THR  - Pain control  - Bowel regimen  - PT/OT  - Incentive spirometry  - VTE PPx - Eliquis    Type 2 DM  - A1c 8.4  - Diabetes NP consult reviewed - patient not interested in traditional BGM - tried in past more than once, wants to talk to PCP about CGM  - discussed post-op risks associated with hyperglycemia including impaired wound healing and increased risk of periprosthetic joint infections  - patient admittingly was making poor food choices recently and knows to change those  - will ask Dr. Solitario to see patient in AM  - hold glimepiride  - medium dose sliding scale AC and HS    HTN/HLD  - resume statin on POD#2  - resume ramipril on POD#2  - resume indapamide on POD#2    Hx of BrCA on adjuvant therapy with aromatase inhibitor  - resume letrozole

## 2023-01-30 NOTE — CONSULT NOTE ADULT - CONSULT REASON
77y A1C with Estimated Average Glucose Result: 8.4 % (01-23-23 @ 12:57)   diabetes mellitus uncontrolled type 2
post-operative medical management

## 2023-01-30 NOTE — BRIEF OPERATIVE NOTE - NSICDXBRIEFPROCEDURE_GEN_ALL_CORE_FT
PROCEDURES:  Total replacement of left hip joint by anterior approach 30-Jan-2023 14:52:18  Thuy Carson

## 2023-01-30 NOTE — DISCHARGE NOTE PROVIDER - NSDCFUSCHEDAPPT_GEN_ALL_CORE_FT
Guille Arthur  [facility] PreAdmits.  Scheduled Appointment: 02/12/2023    Valeria Diaz  [facility] Physician Pending sale to [facility]  ORTHOSURG [address] [place] [address]  Scheduled Appointment: 02/16/2023    Guille Arthur  [facility]  ORTHOSUR[address]  Scheduled Appointment: 03/28/2023

## 2023-01-30 NOTE — DISCHARGE NOTE PROVIDER - NSDCCPCAREPLAN_GEN_ALL_CORE_FT
PRINCIPAL DISCHARGE DIAGNOSIS  Diagnosis: Primary osteoarthritis of left hip  Assessment and Plan of Treatment: PT/OT Anterior Total Hip Protocol; Isometrics, Ambulation, transfers, stairs, & ADLs  Full weight bearing both legs; Walker/cane use as instructed by PT/OT  Anterior THR precautions for 6 weeks: No straight leg raise; No external rotation of hip when extended-standing or lying flat; No hyperextension of hip when standing (kickback)  Ice packs to hip for 30 min.every 3 hours & post P.T.  Prineo tape/suture removal on/near after Post Op Day # 14 in Surgeon's office.  No tub baths  Do not resume driving until you have your surgeons permission  You have a ANA dressing. You may shower. Disconnect ANA battery prior to showering. Reconnect battery after showering and press orange button to resume ANA power. Remove ANA dressing on post-op day #7.  Keep incision clean. DO NOT APPLY ANYTHING to incision site (salves/ointments/creams). Do not scrub incision site. Pat dry after shower.  Suture/Prineo removal 2 weeks after surgery at Surgeon's office.       PRINCIPAL DISCHARGE DIAGNOSIS  Diagnosis: Primary osteoarthritis of left hip  Assessment and Plan of Treatment: PT/OT Anterior Total Hip Protocol; Isometrics, Ambulation, transfers, stairs, & ADLs  Full weight bearing both legs; Walker/cane use as instructed by PT/OT  Anterior THR precautions for 6 weeks: No straight leg raise; No external rotation of hip when extended-standing or lying flat; No hyperextension of hip when standing (kickback)  Ice packs to hip for 30 min.every 3 hours & post P.T.  You have a ANA dressing. You may shower. Disconnect ANA battery prior to showering. Reconnect battery after showering and press orange button to resume ANA power. Remove ANA dressing on post-op day #7.  Keep incision clean. DO NOT APPLY ANYTHING to incision site (salves/ointments/creams). Do not scrub incision site. Pat dry after shower.  Suture/Prineo removal 2 weeks after surgery at Surgeon's office.  do not drive until you have surgeon's permission  Opioid safety : Do not keep opioid in the medicine cabinet in bathroom where others may have access to it.  Do not drive while taking opioid.  To dispose of it some pharmacies do have drop boxes as do police stations.  Do not flush or put in trash.         PRINCIPAL DISCHARGE DIAGNOSIS  Diagnosis: Primary osteoarthritis of left hip  Assessment and Plan of Treatment: PT/OT Anterior Total Hip Protocol; Isometrics, Ambulation, transfers, stairs, & ADLs  Full weight bearing both legs; Walker/cane use as instructed by PT/OT  Anterior THR precautions for 6 weeks: No straight leg raise; No external rotation of hip when extended-standing or lying flat; No hyperextension of hip when standing (kickback)  Ice packs to hip for 20 min.every 3 hours & post P.T.  You have a silverlon dressing. It is water resistant and may get slightly wet in the shower.  Remove dressing in 7 days and leave wound open to air.  Wound may get wet in the shower as long as there is no drainage from   Keep incision clean. DO NOT APPLY ANYTHING to incision site (salves/ointments/creams). Do not scrub incision site. Pat dry after shower.  Suture/Prineo removal 2 weeks after surgery at Surgeon's office.  do not drive until you have surgeon's permission  Opioid safety : Do not keep opioid in the medicine cabinet in bathroom where others may have access to it.  Do not drive while taking opioid.  To dispose of it some pharmacies do have drop boxes as do police stations.  Do not flush or put in trash.

## 2023-01-30 NOTE — DISCHARGE NOTE PROVIDER - INSTRUCTIONS
For Constipation :   • Increase your water intake. Drink at least 8 glasses of water daily.  • Try adding fiber to your diet by eating fruits, vegetables and foods that are rich in grains.  • If you do experience constipation, you may take an over-the-counter stool softener/laxative such as Barbara Colace, Senekot, miralax or  Milk of Magnesia.

## 2023-01-30 NOTE — DISCHARGE NOTE PROVIDER - NSDCMRMEDTOKEN_GEN_ALL_CORE_FT
allopurinol 300 mg oral tablet: 1 tab(s) orally once a day (in the evening)  Calcium 600+D oral tablet: 1 tab(s) orally once a day  CoQ10 100 mg oral capsule: 1 cap(s) orally once a day  glimepiride 2 mg oral tablet: 1 tab(s) orally 2 times a day  indapamide 1.25 mg oral tablet: 1 tab(s) orally once a day (in the morning)  letrozole 2.5 mg oral tablet: 1 tab(s) orally once a day  levothyroxine 75 mcg (0.075 mg) oral tablet: 1 tab(s) orally once a day  MegaRed Advanced 500 mg oral capsule: 1 cap(s) orally once a day  Multiple Vitamins oral tablet: 1 tab(s) orally once a day  pravastatin 20 mg oral tablet: 1 tab(s) orally once a day (at bedtime)  ramipril 10 mg oral capsule: 1 cap(s) orally once a day  Slow Magnesium Chloride with Calcium 71 mg-118 mg oral delayed release tablet: 1 tab(s) orally once a day (at bedtime)   acetaminophen 500 mg oral tablet: 2 tab(s) orally every 8 hours  allopurinol 300 mg oral tablet: 1 tab(s) orally once a day (in the evening)  apixaban 2.5 mg oral tablet: 1 tab(s) orally every 12 hours  Calcium 600+D oral tablet: 1 tab(s) orally once a day  CoQ10 100 mg oral capsule: 1 cap(s) orally once a day  glimepiride 2 mg oral tablet: 1 tab(s) orally 2 times a day  indapamide 1.25 mg oral tablet: 1 tab(s) orally once a day (in the morning)  letrozole 2.5 mg oral tablet: 1 tab(s) orally once a day  levothyroxine 75 mcg (0.075 mg) oral tablet: 1 tab(s) orally once a day  MegaRed Advanced 500 mg oral capsule: 1 cap(s) orally once a day  meloxicam 15 mg oral tablet: 1 tab(s) orally once a day  Multiple Vitamins oral tablet: 1 tab(s) orally once a day  omeprazole 20 mg oral delayed release capsule: 1 cap(s) orally once a day  before breakfast   oxyCODONE 5 mg oral tablet: 1-2 tab(s) orally every 4 hours, As Needed -Moderate to severe Pain  MDD:6   [id number]  polyethylene glycol 3350 oral powder for reconstitution: 17 gram(s) orally once a day (at bedtime)  pravastatin 20 mg oral tablet: 1 tab(s) orally once a day (at bedtime)  ramipril 10 mg oral capsule: 1 cap(s) orally once a day  senna leaf extract oral tablet: 2 tab(s) orally once a day (at bedtime)  Slow Magnesium Chloride with Calcium 71 mg-118 mg oral delayed release tablet: 1 tab(s) orally once a day (at bedtime)   acetaminophen 500 mg oral tablet: 2 tab(s) orally every 8 hours  allopurinol 300 mg oral tablet: 1 tab(s) orally once a day (in the evening)  apixaban 2.5 mg oral tablet: 1 tab(s) orally every 12 hours  Calcium 600+D oral tablet: 1 tab(s) orally once a day  CoQ10 100 mg oral capsule: 1 cap(s) orally once a day  glimepiride 2 mg oral tablet: 1 tab(s) orally 2 times a day  indapamide 1.25 mg oral tablet: 1 tab(s) orally once a day (in the morning)  letrozole 2.5 mg oral tablet: 1 tab(s) orally once a day  levothyroxine 75 mcg (0.075 mg) oral tablet: 1 tab(s) orally once a day  MegaRed Advanced 500 mg oral capsule: 1 cap(s) orally once a day  Multiple Vitamins oral tablet: 1 tab(s) orally once a day  omeprazole 20 mg oral delayed release capsule: 1 cap(s) orally once a day  before breakfast   oxyCODONE 5 mg oral tablet: 1-2 tab(s) orally every 4 hours, As Needed -Moderate to severe Pain  MDD:6   [id number]  polyethylene glycol 3350 oral powder for reconstitution: 17 gram(s) orally once a day (at bedtime)  pravastatin 20 mg oral tablet: 1 tab(s) orally once a day (at bedtime)  ramipril 10 mg oral capsule: 1 cap(s) orally once a day  senna leaf extract oral tablet: 2 tab(s) orally once a day (at bedtime)  Slow Magnesium Chloride with Calcium 71 mg-118 mg oral delayed release tablet: 1 tab(s) orally once a day (at bedtime)

## 2023-01-30 NOTE — CONSULT NOTE ADULT - SUBJECTIVE AND OBJECTIVE BOX
Information Obtained from:  EHR, Physical Chart, Patient at bedside     HPI:  78yo F admitted s/p LEFT THR today by anterior approach.  Has had 1 year of worsening left hip pain an dgroin pain, pain up to 10/10 with activity, better with rest and Aleve.  No fevers, chills, sweats, dizziness, HA, changes in vision, cp, palpitations, sob, persistent cough, n/v/d, abd pain, dysuria, focal weakness, or calf pain.      REVIEW OF SYSTEMS:  see HPI, others negative    PAST MEDICAL & SURGICAL HISTORY:  Essential hypertension      Hypothyroidism      Gout      Prolapsed uterus      Malignant neoplasm of upper-outer quadrant of right female breast, unspecified estrogen receptor status      COVID-19 vaccine series completed      Hyperlipidemia      Type 2 diabetes mellitus      History of lumpectomy of right breast      Osteoarthritis      Urge incontinence      History of hip replacement, total, right  2007      Status post hysterectomy  Partial 1997      History of right breast biopsy  3/1/19    SOCIAL HISTORY:  ETOH consumption: wine  Smoking Hx: 30PY, quit 25ya  Illicit Drug Use:  None    Allergies    adhesives (Rash)  Bee venom (Anaphylaxis; Rash)  latex (Hives; Rash)  sulfa drugs (Rash)    Intolerances     Home Medications:  allopurinol 300 mg oral tablet: 1 tab(s) orally once a day (in the evening) (30 Jan 2023 11:45)  Calcium 600+D oral tablet: 1 tab(s) orally once a day (30 Jan 2023 11:45)  CoQ10 100 mg oral capsule: 1 cap(s) orally once a day (30 Jan 2023 11:45)  glimepiride 2 mg oral tablet: 1 tab(s) orally 2 times a day (30 Jan 2023 11:45)  indapamide 1.25 mg oral tablet: 1 tab(s) orally once a day (in the morning) (30 Jan 2023 11:45)  letrozole 2.5 mg oral tablet: 1 tab(s) orally once a day (30 Jan 2023 11:45)  levothyroxine 75 mcg (0.075 mg) oral tablet: 1 tab(s) orally once a day (30 Jan 2023 11:45)  MegaRed Advanced 500 mg oral capsule: 1 cap(s) orally once a day (30 Jan 2023 11:45)  Multiple Vitamins oral tablet: 1 tab(s) orally once a day (30 Jan 2023 11:45)  pravastatin 20 mg oral tablet: 1 tab(s) orally once a day (at bedtime) (30 Jan 2023 11:45)  ramipril 10 mg oral capsule: 1 cap(s) orally once a day (30 Jan 2023 11:45)  Slow Magnesium Chloride with Calcium 71 mg-118 mg oral delayed release tablet: 1 tab(s) orally once a day (at bedtime) (30 Jan 2023 11:45)    FAMILY HISTORY:  Family history of meningitis (Father)    Family history of breast cancer (Mother)    Family history of hypertension (Mother)    Family history of thyroid disease (Sibling)    PHYSICAL EXAM:  Vital Signs Last 24 Hrs  T(C): 36.2 (30 Jan 2023 17:49), Max: 36.8 (30 Jan 2023 11:34)  T(F): 97.2 (30 Jan 2023 17:49), Max: 98.2 (30 Jan 2023 11:34)  HR: 76 (30 Jan 2023 17:49) (62 - 81)  BP: 120/63 (30 Jan 2023 17:49) (100/73 - 150/75)  BP(mean): --  RR: 18 (30 Jan 2023 17:49) (15 - 20)  SpO2: 96% (30 Jan 2023 17:49) (96% - 100%)    Parameters below as of 30 Jan 2023 17:25  Patient On (Oxygen Delivery Method): room air        GENERAL: NAD, well-groomed, well-developed, awake, alert, oriented x 3, fluent and coherent speech  EYES: EOMI, PERRLA, conjunctiva and sclera clear  ENMT: No tonsillar erythema, exudates, or enlargement; Moist mucous membranes, No lesions on oral mucosa  NECK: Supple, No JVD, No Cervical LAD, No thyromegaly, No thyroid nodules  NERVOUS SYSTEM:  Good concentration;  No facial droop  CHEST/LUNG: Clear to auscultation bilaterally; No rales, rhonchi, wheezing, or rubs  HEART: Regular rate and rhythm; No murmurs, rubs, or gallops  ABDOMEN: Soft, Nontender, Nondistended, Bowel sounds present, No palpable masses or organomegaly, No bruits  EXTREMITIES:  2+ Peripheral Pulses, No clubbing, cyanosis, or edema  INCISION:  Dressing clean/dry/intact    LABS:  hgba1c 8.4  Cr 1.32    EKG (was personally reviewed):       yes, NSR                                      
Patient is a 77y old  Female who presents with a chief complaint of Left anterior total hip replacement (30 Jan 2023 14:54)    Typ 2 DM, DX >10 years ago, no known complications or hx DKA/HSS. managed by PCP Dr. Celeste Simeon, last seen Jan 17, A1c 8.4%, reports high for her usually mid 7%. Rx home Glimepiride 2mg BID, was previously on metformin but stopped r/t GI side effects. discussed w/ patient medication MOA and side effects, efficacy in lowering A1c. pt does not do glucometer checks at home, discussed w/ pt importance of BGM, offered to send script for testing supplies, patient refused, discussed CGM options, pt states she will talk w/ her PCP. reviewed consistent carb diet, pt admits to eating a lot of sweet over the past few months, discussed carb identification and portion control, balanced plate method, pt states she is cutting out bread and other carbs, limiting sweets, inquiring about the keto diet. provided pt diabetes dietary meal planning guide. discussed importance of glycemic control for Sx site healing, educated on physical activity 150min/week moderate intensity exercise. verbal education and written handouts was  diabetes education provided- A1c measure and BG targets  fasting, <180 2 hours postmeal. medication MOA and considerations/side effects, inhospital BGM frequency and insulin administration, s/s of hyperglycemia/hypoglycemia and management, glycemic control and preventing complications, consistent carb diet, balanced plate method, consistent meal planning. sick day management, provider f/u  Hx HLD, hypothyroidism    HPI:      PAST MEDICAL & SURGICAL HISTORY:  Essential hypertension      Hypothyroidism      Gout      Prolapsed uterus      Malignant neoplasm of upper-outer quadrant of right female breast, unspecified estrogen receptor status      COVID-19 vaccine series completed      Hyperlipidemia      Type 2 diabetes mellitus      History of lumpectomy of right breast      Osteoarthritis      Urge incontinence      History of hip replacement, total, right  2007      Status post hysterectomy  Partial 1997      History of right breast biopsy  3/1/19      Allergies    adhesives (Rash)  Bee venom (Anaphylaxis; Rash)  latex (Hives; Rash)  sulfa drugs (Rash)    Intolerances        MEDICATIONS  (STANDING):  ceFAZolin   IVPB 2000 milliGRAM(s) IV Intermittent every 8 hours  lactated ringers. 1000 milliLiter(s) (75 mL/Hr) IV Continuous <Continuous>  sodium chloride 0.9% Bolus 500 milliLiter(s) IV Bolus once

## 2023-01-31 ENCOUNTER — TRANSCRIPTION ENCOUNTER (OUTPATIENT)
Age: 78
End: 2023-01-31

## 2023-01-31 LAB
ANION GAP SERPL CALC-SCNC: 13 MMOL/L — SIGNIFICANT CHANGE UP (ref 5–17)
BUN SERPL-MCNC: 35 MG/DL — HIGH (ref 7–23)
CALCIUM SERPL-MCNC: 9 MG/DL — SIGNIFICANT CHANGE UP (ref 8.4–10.5)
CHLORIDE SERPL-SCNC: 104 MMOL/L — SIGNIFICANT CHANGE UP (ref 96–108)
CO2 SERPL-SCNC: 24 MMOL/L — SIGNIFICANT CHANGE UP (ref 22–31)
CREAT SERPL-MCNC: 1.87 MG/DL — HIGH (ref 0.5–1.3)
EGFR: 28 ML/MIN/1.73M2 — LOW
GLUCOSE BLDC GLUCOMTR-MCNC: 252 MG/DL — HIGH (ref 70–99)
GLUCOSE BLDC GLUCOMTR-MCNC: 264 MG/DL — HIGH (ref 70–99)
GLUCOSE BLDC GLUCOMTR-MCNC: 283 MG/DL — HIGH (ref 70–99)
GLUCOSE BLDC GLUCOMTR-MCNC: 369 MG/DL — HIGH (ref 70–99)
GLUCOSE SERPL-MCNC: 277 MG/DL — HIGH (ref 70–99)
HCT VFR BLD CALC: 39.2 % — SIGNIFICANT CHANGE UP (ref 34.5–45)
HGB BLD-MCNC: 13 G/DL — SIGNIFICANT CHANGE UP (ref 11.5–15.5)
MCHC RBC-ENTMCNC: 31.3 PG — SIGNIFICANT CHANGE UP (ref 27–34)
MCHC RBC-ENTMCNC: 33.2 GM/DL — SIGNIFICANT CHANGE UP (ref 32–36)
MCV RBC AUTO: 94.2 FL — SIGNIFICANT CHANGE UP (ref 80–100)
NRBC # BLD: 0 /100 WBCS — SIGNIFICANT CHANGE UP (ref 0–0)
PLATELET # BLD AUTO: 185 K/UL — SIGNIFICANT CHANGE UP (ref 150–400)
POTASSIUM SERPL-MCNC: 4.5 MMOL/L — SIGNIFICANT CHANGE UP (ref 3.5–5.3)
POTASSIUM SERPL-SCNC: 4.5 MMOL/L — SIGNIFICANT CHANGE UP (ref 3.5–5.3)
RBC # BLD: 4.16 M/UL — SIGNIFICANT CHANGE UP (ref 3.8–5.2)
RBC # FLD: 14.4 % — SIGNIFICANT CHANGE UP (ref 10.3–14.5)
SODIUM SERPL-SCNC: 141 MMOL/L — SIGNIFICANT CHANGE UP (ref 135–145)
WBC # BLD: 16.96 K/UL — HIGH (ref 3.8–10.5)
WBC # FLD AUTO: 16.96 K/UL — HIGH (ref 3.8–10.5)

## 2023-01-31 RX ORDER — ACETAMINOPHEN 500 MG
2 TABLET ORAL
Qty: 0 | Refills: 0 | DISCHARGE
Start: 2023-01-31

## 2023-01-31 RX ORDER — OXYCODONE HYDROCHLORIDE 5 MG/1
1 TABLET ORAL
Qty: 42 | Refills: 0
Start: 2023-01-31 | End: 2023-02-06

## 2023-01-31 RX ORDER — INSULIN LISPRO 100/ML
VIAL (ML) SUBCUTANEOUS
Refills: 0 | Status: DISCONTINUED | OUTPATIENT
Start: 2023-01-31 | End: 2023-02-01

## 2023-01-31 RX ORDER — INSULIN LISPRO 100/ML
VIAL (ML) SUBCUTANEOUS AT BEDTIME
Refills: 0 | Status: DISCONTINUED | OUTPATIENT
Start: 2023-01-31 | End: 2023-02-01

## 2023-01-31 RX ORDER — SENNA PLUS 8.6 MG/1
2 TABLET ORAL
Qty: 0 | Refills: 0 | DISCHARGE
Start: 2023-01-31

## 2023-01-31 RX ORDER — SODIUM CHLORIDE 9 MG/ML
1000 INJECTION INTRAMUSCULAR; INTRAVENOUS; SUBCUTANEOUS
Refills: 0 | Status: DISCONTINUED | OUTPATIENT
Start: 2023-01-31 | End: 2023-02-01

## 2023-01-31 RX ORDER — OMEPRAZOLE 10 MG/1
1 CAPSULE, DELAYED RELEASE ORAL
Qty: 30 | Refills: 1
Start: 2023-01-31 | End: 2023-03-31

## 2023-01-31 RX ORDER — POLYETHYLENE GLYCOL 3350 17 G/17G
17 POWDER, FOR SOLUTION ORAL
Qty: 0 | Refills: 0 | DISCHARGE
Start: 2023-01-31

## 2023-01-31 RX ORDER — MELOXICAM 15 MG/1
1 TABLET ORAL
Qty: 30 | Refills: 0
Start: 2023-01-31 | End: 2023-03-01

## 2023-01-31 RX ORDER — APIXABAN 2.5 MG/1
1 TABLET, FILM COATED ORAL
Qty: 56 | Refills: 0
Start: 2023-01-31 | End: 2023-02-27

## 2023-01-31 RX ADMIN — SODIUM CHLORIDE 100 MILLILITER(S): 9 INJECTION, SOLUTION INTRAVENOUS at 00:36

## 2023-01-31 RX ADMIN — Medication 100 MILLIGRAM(S): at 05:57

## 2023-01-31 RX ADMIN — Medication 1000 MILLIGRAM(S): at 08:19

## 2023-01-31 RX ADMIN — APIXABAN 2.5 MILLIGRAM(S): 2.5 TABLET, FILM COATED ORAL at 22:36

## 2023-01-31 RX ADMIN — Medication 1000 MILLIGRAM(S): at 17:48

## 2023-01-31 RX ADMIN — MELOXICAM 15 MILLIGRAM(S): 15 TABLET ORAL at 08:20

## 2023-01-31 RX ADMIN — MELOXICAM 15 MILLIGRAM(S): 15 TABLET ORAL at 08:23

## 2023-01-31 RX ADMIN — Medication 10: at 12:40

## 2023-01-31 RX ADMIN — Medication 1000 MILLIGRAM(S): at 08:23

## 2023-01-31 RX ADMIN — APIXABAN 2.5 MILLIGRAM(S): 2.5 TABLET, FILM COATED ORAL at 14:00

## 2023-01-31 RX ADMIN — ATORVASTATIN CALCIUM 10 MILLIGRAM(S): 80 TABLET, FILM COATED ORAL at 22:36

## 2023-01-31 RX ADMIN — SODIUM CHLORIDE 100 MILLILITER(S): 9 INJECTION INTRAMUSCULAR; INTRAVENOUS; SUBCUTANEOUS at 22:41

## 2023-01-31 RX ADMIN — Medication 2: at 22:35

## 2023-01-31 RX ADMIN — Medication 6: at 08:19

## 2023-01-31 RX ADMIN — Medication 101.6 MILLIGRAM(S): at 05:56

## 2023-01-31 RX ADMIN — SODIUM CHLORIDE 100 MILLILITER(S): 9 INJECTION, SOLUTION INTRAVENOUS at 05:57

## 2023-01-31 RX ADMIN — Medication 300 MILLIGRAM(S): at 22:36

## 2023-01-31 RX ADMIN — Medication 1000 MILLIGRAM(S): at 00:45

## 2023-01-31 RX ADMIN — LETROZOLE 2.5 MILLIGRAM(S): 2.5 TABLET, FILM COATED ORAL at 08:27

## 2023-01-31 RX ADMIN — Medication 75 MICROGRAM(S): at 05:56

## 2023-01-31 RX ADMIN — PANTOPRAZOLE SODIUM 40 MILLIGRAM(S): 20 TABLET, DELAYED RELEASE ORAL at 05:56

## 2023-01-31 RX ADMIN — Medication 400 MILLIGRAM(S): at 00:36

## 2023-01-31 NOTE — DISCHARGE NOTE NURSING/CASE MANAGEMENT/SOCIAL WORK - PATIENT PORTAL LINK FT
You can access the FollowMyHealth Patient Portal offered by [facility] by registering at the following website: [email or web address]. By joining [employer]’s FollowMyHealth portal, you will also be able to view your health information using other applications (apps) compatible with our system.

## 2023-01-31 NOTE — CARE COORDINATION ASSESSMENT. - PATIENT'S UNDERSTANDING OF CONDITION AND TREATMENT
Requested a Visiting nurse Home care visit to provide additional DM management teaching.  Pt. seen by Ga Roberts DM NP educator here.    Will get Home PT for Home exercise program./needs additional information

## 2023-01-31 NOTE — CARE COORDINATION ASSESSMENT. - NSCAREPROVIDERS_GEN_ALL_CORE_FT
CARE PROVIDERS:  Administration: Winnie Galicia  Administration: Dina Galarza  Admitting: Guille Arthur  Attending: Guille Arthur  Case Management: Nahomy Irby  Case Management: Addis Sweet  Consultant: Houston Schaefer  Consultant: Ga Roberts  Consultant: Jamar Rapp  Consultant: Weil, Patricia  Infection Control: Benita Kaur  Nurse: Shabana Martinez  Nurse: Sahra Orellana  Nurse: Griselda Eddy  Nurse: Laila Tavares  Occupational Therapy: Barbara Antunez  Override: Laila Tavares  Override: Maira Ma  PCA/Nursing Assistant: Julia Harvey  Physical Therapy: Karen Hernandez  Primary Team: Snehal Ulloa  Primary Team: Giacomo Adler  Primary Team: Jonas Belle  Primary Team: Thuy Carson  Primary Team: Gabriella Sewell  Team: [facility] Hospitalists, Team   CARE PROVIDERS:  Administration: Winnie Galicia  Administration: Dina Galarza  Admitting: Guille Arthur  Attending: Guille Arthur  Case Management: Nahomy Irby  Case Management: Addis Sweet  Consultant: Houston Schaefer  Consultant: Jamar Rapp  Consultant: Weil, Patricia  Infection Control: Benita Kaur  Nurse: Shabana Martinez  Nurse: Sahra Orellana  Nurse: Griselda Eddy  Nurse: Laila Tavares  Occupational Therapy: Barbara Antunez  Override: Laila Tavares  Override: Maira Ma  PCA/Nursing Assistant: Julia Harvey  Physical Therapy: Karen Hernandez  Primary Team: Snehal Ulloa  Primary Team: Giacomo Adler  Primary Team: Jonas Belle  Primary Team: Thuy Carson  Primary Team: Gabriella Sewell  Team: [facility]  Hospitalists, Team

## 2023-01-31 NOTE — OCCUPATIONAL THERAPY INITIAL EVALUATION ADULT - ADDITIONAL COMMENTS
Lives with her . 3 steps to enter. Then can stay on 1st floor. No steps inside. Stall shower. No DME prior

## 2023-01-31 NOTE — CARE COORDINATION ASSESSMENT. - NSPASTMEDSURGHISTORY_GEN_ALL_CORE_FT
PAST MEDICAL & SURGICAL HISTORY:  Malignant neoplasm of upper-outer quadrant of right female breast, unspecified estrogen receptor status      Prolapsed uterus      Gout      Hypothyroidism      Essential hypertension      Status post hysterectomy  Partial 1997      History of hip replacement, total, right  2007      History of right breast biopsy  3/1/19      Urge incontinence      Osteoarthritis      History of lumpectomy of right breast      Type 2 diabetes mellitus      Hyperlipidemia      COVID-19 vaccine series completed

## 2023-01-31 NOTE — CAREGIVER ENGAGEMENT NOTE - CAREGIVER EDUCATION HOME CARE SERVICES - FREE TEXT
Pt. chose [facility] from List of [facility] and CM made referral for HC/PT/OT and / [facility] for reinforce DM teaching

## 2023-01-31 NOTE — CARE COORDINATION ASSESSMENT. - SOURCES OF SUPPORT FOR PATIENT
Pt. called her spouse Minchanell from her speakerphone during this assessment to  make him aware of Home care process and expectations.  Rizwan states he will be available to assist pt. as needed and transport her home post acute./spouse

## 2023-01-31 NOTE — CARE COORDINATION ASSESSMENT. - NSDCPLANSERVICES_GEN_ALL_CORE
CM met with patient and spouse Rizwan( over pt. speaker phone) at bedside explained role and provided CM contact information.  Pt. states she lives with spouse in a a Pvt. Home with 2 step to enter and 0 inside.  Pt. 23 hour stay s/p left anterior THR 1/30/23. Pt. may need to stay due to Blood sugar levels.  Pt. seen by DM NP and states she will follow up with PCP and find a new Endo post acute.  Pt. states she was not doing well with her diet in the past , but  the past few weeks she changed her diet and will now try to maintain consistent carb diet.   Provided discharge planning resource folder with list of [facility] and pt. chose [facility] HC/PT/ VN for DM teaching.  CM sent referral with requested Start of care 2/2/23.  f2f sent.  Pt. in agreement with transition plans.  IMM explained / signed and given copy.  CM following./Durable Medical Equipment/Home Care CM met with patient and spouse Rizwan( over pt. speaker phone) at bedside explained role and provided CM contact information.  Pt. states she lives with spouse in a a Pvt. Home with 2 step to enter and 0 inside.  Pt. 23 hour stay s/p left anterior THR 1/30/23. Pt. may need to stay due to Blood sugar levels.  Pt. seen by DM NP and states she will follow up with PCP and find a new Endo post acute.  Pt. states she was not doing well with her diet in the past , but  the past few weeks she changed her diet and will now try to maintain consistent carb diet.   Provided discharge planning resource folder with list of [facility] and pt. chose [facility] HC/PT/ VN for DM teaching.  CM sent referral with requested Start of care 2/2/23.  f2f sent.   CM provided pt. with resource list for [facility]  outpatient List.  Educated pt. to call the [employer] insurance number on the back of ID card to get names of local Outpatient PT near her.  Provided her with pencil and paper.  Pt. reported back to CM that she did call and has a list she will follow up with at home.   Pt. in agreement with transition plans.  IMM explained / signed and given copy.  CM following.      ----  Dr Simeon, Celeste  [phone]  Pharmacy is [address] [phone]  Pt. is for Meds to bed. with [facility] ./Durable Medical Equipment/Home Care

## 2023-01-31 NOTE — OCCUPATIONAL THERAPY INITIAL EVALUATION ADULT - LONG TERM MEMORY, REHAB EVAL
DATE OF SERVICE:  01/18/2018

 

SUBJECTIVE:  Ms. Abernathy states she has been feeling worse since her steroids stopped.

 

OBJECTIVE:

VITAL SIGNS:  She complains of low-grade temperature up to 99.8, pulse 88, blood pressure 105/65.

LUNGS:  Clear.

ABDOMEN:  Soft, nontender.

 

LABORATORY STUDIES:  White count 15.2, hemoglobin 12.6, platelet count of 411, this is down from a pl
atelet count of 591 on admission.

 

ASSESSMENT:  Colitis, distal 60 cm, severe.  She had been on IV steroids for about 11 days.  When she
 was switched to oral 2 days ago, she seems to have bumped her symptoms a little bit since that time;
 however, hemoglobin staying stable and platelet count is dropping.  She states she feels worse.

 

PLAN:  We will reevaluate sigmoidoscopy tomorrow.  If this is stable and not worsening, we will disch
arge her home on her steroids with plans for Humira again this coming Monday in 4 days. intact

## 2023-01-31 NOTE — OCCUPATIONAL THERAPY INITIAL EVALUATION ADULT - TOILETING, PREVIOUS LEVEL OF FUNCTION, OT EVAL
Problem: Chronic Conditions and Co-morbidities  Goal: Patient's chronic conditions and co-morbidity symptoms are monitored and maintained or improved  Outcome: Completed
independent

## 2023-01-31 NOTE — PATIENT PROFILE ADULT - PATIENT'S PREFERRED PRONOUN
Subjective


Complaints:  Shortness of Breath


General Problems:  Anemia, Hypertension, Mebatolic Acidosis


Renal Failure:  Chronic, Acute


Interval History


Labs in process. She is wanting to go home. 


 (Migdalia Camejo)





Review of Systems


General


Constitutional:  Fatigue


 (Migdalia Camejo)





Respiratory


Lungs:  SOB, Cough


 (Migdalia Camejo)





Musculoskeletal


MS:  Pain/Stiffness


 (Migdalia Camejo)





Objective Data


Data





Vital Signs








  Date Time  Temp Pulse Resp B/P (MAP) Pulse Ox O2 Delivery O2 Flow Rate FiO2


 


8/28/17 08:14      Room Air  


 


8/28/17 08:01 98.4 82 18 118/72 (87) 98   


 


8/28/17 04:00 98.3 78 18 133/70 (91) 97   


 


8/28/17 00:00      Room Air  


 


8/28/17 00:00 98.2 80 18 139/72 (94) 97   


 


8/27/17 20:00 98.8 84 18 141/73 (95) 99   


 


8/27/17 20:00      Room Air  


 


8/27/17 16:00 98.6 83 18 159/77 (104) 96   


 


8/27/17 12:00 98.5 79 18 170/77 (108) 100   








 (Migdalia Camejo)


-:  


8/27/17 0851





Tubes & Lines:  Rhodes


 (Migdalia Camejo)





Physical Exam


General


Appearance:  Well Developed, Well Nourished, No Acute Distress, Comfortable


 (Migdalia Camejo)





Throat


Throat Exam:  Oral Mucosa Pink & Moist


 (Migdalia Camejo)





Pulmonary


Resp Exam:  Clear Bilaterally, Breath Sounds Equal, No Distress, Labored


 (Migdalia Camejo)





Cardiology


CV Exam:  Regular, Normal Sinus Rhythm, Good Perfusion


 (Migdalia Camejo)





Gastrointestinal/Abdomen


GI Exam:  Soft, Non-Tender, Bowel Sounds Present


 (Migdalia Camejo)





Musculoskeletal


MS Exam:  Joints Intact, Normal Gait, Normal Tone, Good Strength


 (Migdalia Camejo)





Integumentary


Skin Exam:  Clear, Warm, Dry, Intact


 (Migdalia Camejo)





Extremeties


Extremities Exam:  Trace Edema


 (Migdalia Camejo)





Neurologic


Neuro Exam:  Alert, Awake, Oriented, Speech Clear


 (Migdalia Camejo)





VTE Prophylaxis


Device:  SCDs


 (Migdalia Camejo)





Assessment/Plan


Discussed Condition With:  Patient


Assessment Summary:  LETI/Acute Renal Failure, Anemia of CKD, Hypertension, CKD 

Stage IV


Electrolyte Assessment:  Hyperkalemia


Problem List:  


(1) Acute kidney injury superimposed on CKD


ICD Codes:  N17.9 - Acute kidney failure, unspecified; N18.9 - Chronic kidney 

disease, unspecified


Status:  Acute


Plan:  Suspected underlying CKD, possibly due to diabetic nephropathy, stage 4 

with multiple episodes of LETI. 


 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 





 LETI likely due to infection, sepsis syndrome, also given NSAIDs


Renal function stabilized, today's levels are in process


she is on bumex PO once daily , continue


monitor urine output, currently non oliguric





(2) Severe sepsis without septic shock


ICD Codes:  A41.9 - Sepsis, unspecified organism; R65.20 - Severe sepsis 

without septic shock


Status:  Acute


Plan:  respiratory source, possible viral illness


Renal dose medications and antibiotics as possible.


On oxygen


she is on IV cefepime and po Levaquin 





(3) HTN (hypertension)


ICD Codes:  I10 - Hypertension


Status:  Chronic


Plan:  Blood pressure is stable


continue to monitor blood pressure


continue ordered medications, avoid ACE 





(4) Diabetes


ICD Codes:  E11.9 - Diabetes mellitus


Status:  Chronic


Plan:  continue insulin therapy


glucose goal 140-180 mg/dL





avoid metformin (home medication) given hx of CKD 3-4





(5) Anemia


ICD Codes:  D64.9 - Anemia, unspecified


Status:  Chronic


Plan:  Hb improved and stable 


follow CBC 


 (Migdalia Camejo)


Problem List:  


(1) Acute kidney injury superimposed on CKD


ICD Codes:  N17.9 - Acute kidney failure, unspecified; N18.9 - Chronic kidney 

disease, unspecified


Status:  Acute


Plan:  Suspected underlying CKD, possibly due to diabetic nephropathy, stage 4 

with multiple episodes of LETI. 


 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 





 LETI likely due to infection, sepsis syndrome, also given NSAIDs


Renal function stabilized, today's levels are in process


she is on bumex PO once daily , continue


monitor urine output, currently non oliguric





(2) Severe sepsis without septic shock


ICD Codes:  A41.9 - Sepsis, unspecified organism; R65.20 - Severe sepsis 

without septic shock


Status:  Acute


Plan:  respiratory source, possible viral illness


Renal dose medications and antibiotics as possible.


On oxygen


she is on IV cefepime and po Levaquin 





(3) HTN (hypertension)


ICD Codes:  I10 - Hypertension


Status:  Chronic


Plan:  Blood pressure is stable


continue to monitor blood pressure


continue ordered medications, avoid ACE 





(4) Diabetes


ICD Codes:  E11.9 - Diabetes mellitus


Status:  Chronic


Plan:  continue insulin therapy


glucose goal 140-180 mg/dL





avoid metformin (home medication) given hx of CKD 3-4





(5) Anemia


ICD Codes:  D64.9 - Anemia, unspecified


Status:  Chronic


Plan:  Hb improved and stable 


follow CBC 





Plan


patient was seen and examined. No immediate plans for dialysis. Will followup 

in our office. Can be discharged from renal standpoint. 


 (Kelton Whiteside MD)





Problem Qualifiers





(1) HTN (hypertension):  


Qualified Codes:  I10 - Essential (primary) hypertension


(2) Anemia:  


Qualified Codes:  N18.9 - Chronic kidney disease, unspecified; D63.1 - Anemia 

in chronic kidney disease








Migdalia Camejo Aug 28, 2017 10:52


Kelton Whiteside MD Aug 29, 2017 10:04 normal (ped)... Her/She

## 2023-01-31 NOTE — PHARMACOTHERAPY INTERVENTION NOTE - COMMENTS
Pravastatin 20mg QDay  Admission medication reconciliation POD1 
Transition of Care video discharge education - medication calendar given to patient

## 2023-01-31 NOTE — DISCHARGE NOTE NURSING/CASE MANAGEMENT/SOCIAL WORK - NSDCDMENAME_GEN_ALL_CORE_FT
[facility] [place] [address], [place] [address]  Phone: [phone]  [facility] [place] [facility] [place] [address], [place] [address]

## 2023-01-31 NOTE — DISCHARGE NOTE NURSING/CASE MANAGEMENT/SOCIAL WORK - NSSCCONTNUM_GEN_ALL_CORE
Please contact the home care agency at  [phone] or  [phone] if you have not heard from them by 10 AM on the day after your hospital discharge.

## 2023-01-31 NOTE — CAREGIVER ENGAGEMENT NOTE - CAREGIVER EDUCATION - TYPES DISCUSSED
Made referral for RW/ riya to [facility] [phone] surgical for delivery to bedside.  Instructed pt/ spouse to be sure to bring them home when cleared./Discharge plan/DME/Home Care Services

## 2023-01-31 NOTE — DISCHARGE NOTE NURSING/CASE MANAGEMENT/SOCIAL WORK - NSDCPEFALRISK_GEN_ALL_CORE
For information on Fall & Injury Prevention, visit: [email or web address] OR  [email or web address] OR  https://www.cdc.gov/steadi/patient.html

## 2023-01-31 NOTE — DISCHARGE NOTE NURSING/CASE MANAGEMENT/SOCIAL WORK - NSDCDMETYPESERV_GEN_ALL_CORE_FT
Rolling walker and commode for bedside delivery.  Rolling walker and riya for bedside delivery 1/31/23.

## 2023-02-01 VITALS
TEMPERATURE: 98 F | SYSTOLIC BLOOD PRESSURE: 125 MMHG | RESPIRATION RATE: 18 BRPM | OXYGEN SATURATION: 98 % | DIASTOLIC BLOOD PRESSURE: 71 MMHG | HEART RATE: 71 BPM

## 2023-02-01 LAB
ANION GAP SERPL CALC-SCNC: 10 MMOL/L — SIGNIFICANT CHANGE UP (ref 5–17)
BUN SERPL-MCNC: 40 MG/DL — HIGH (ref 7–23)
CALCIUM SERPL-MCNC: 8.2 MG/DL — LOW (ref 8.4–10.5)
CHLORIDE SERPL-SCNC: 113 MMOL/L — HIGH (ref 96–108)
CO2 SERPL-SCNC: 21 MMOL/L — LOW (ref 22–31)
CREAT SERPL-MCNC: 1.42 MG/DL — HIGH (ref 0.5–1.3)
EGFR: 39 ML/MIN/1.73M2 — LOW
GLUCOSE BLDC GLUCOMTR-MCNC: 181 MG/DL — HIGH (ref 70–99)
GLUCOSE BLDC GLUCOMTR-MCNC: 215 MG/DL — HIGH (ref 70–99)
GLUCOSE SERPL-MCNC: 216 MG/DL — HIGH (ref 70–99)
HCT VFR BLD CALC: 34.5 % — SIGNIFICANT CHANGE UP (ref 34.5–45)
HGB BLD-MCNC: 11.4 G/DL — LOW (ref 11.5–15.5)
MCHC RBC-ENTMCNC: 31.2 PG — SIGNIFICANT CHANGE UP (ref 27–34)
MCHC RBC-ENTMCNC: 33 GM/DL — SIGNIFICANT CHANGE UP (ref 32–36)
MCV RBC AUTO: 94.5 FL — SIGNIFICANT CHANGE UP (ref 80–100)
NRBC # BLD: 0 /100 WBCS — SIGNIFICANT CHANGE UP (ref 0–0)
PLATELET # BLD AUTO: 158 K/UL — SIGNIFICANT CHANGE UP (ref 150–400)
POTASSIUM SERPL-MCNC: 4.1 MMOL/L — SIGNIFICANT CHANGE UP (ref 3.5–5.3)
POTASSIUM SERPL-SCNC: 4.1 MMOL/L — SIGNIFICANT CHANGE UP (ref 3.5–5.3)
RBC # BLD: 3.65 M/UL — LOW (ref 3.8–5.2)
RBC # FLD: 14.8 % — HIGH (ref 10.3–14.5)
SODIUM SERPL-SCNC: 144 MMOL/L — SIGNIFICANT CHANGE UP (ref 135–145)
WBC # BLD: 14.08 K/UL — HIGH (ref 3.8–10.5)
WBC # FLD AUTO: 14.08 K/UL — HIGH (ref 3.8–10.5)

## 2023-02-01 PROCEDURE — 99232 SBSQ HOSP IP/OBS MODERATE 35: CPT

## 2023-02-01 PROCEDURE — 97165 OT EVAL LOW COMPLEX 30 MIN: CPT

## 2023-02-01 PROCEDURE — 97110 THERAPEUTIC EXERCISES: CPT

## 2023-02-01 PROCEDURE — C1889: CPT

## 2023-02-01 PROCEDURE — 99231 SBSQ HOSP IP/OBS SF/LOW 25: CPT | Mod: 24

## 2023-02-01 PROCEDURE — 85027 COMPLETE CBC AUTOMATED: CPT

## 2023-02-01 PROCEDURE — C1776: CPT

## 2023-02-01 PROCEDURE — C1713: CPT

## 2023-02-01 PROCEDURE — 80048 BASIC METABOLIC PNL TOTAL CA: CPT

## 2023-02-01 PROCEDURE — 76000 FLUOROSCOPY <1 HR PHYS/QHP: CPT

## 2023-02-01 PROCEDURE — 82962 GLUCOSE BLOOD TEST: CPT

## 2023-02-01 PROCEDURE — 97116 GAIT TRAINING THERAPY: CPT

## 2023-02-01 PROCEDURE — 97530 THERAPEUTIC ACTIVITIES: CPT

## 2023-02-01 PROCEDURE — 97535 SELF CARE MNGMENT TRAINING: CPT

## 2023-02-01 PROCEDURE — 36415 COLL VENOUS BLD VENIPUNCTURE: CPT

## 2023-02-01 RX ADMIN — Medication 1000 MILLIGRAM(S): at 12:08

## 2023-02-01 RX ADMIN — Medication 2: at 08:03

## 2023-02-01 RX ADMIN — LETROZOLE 2.5 MILLIGRAM(S): 2.5 TABLET, FILM COATED ORAL at 12:08

## 2023-02-01 RX ADMIN — SODIUM CHLORIDE 100 MILLILITER(S): 9 INJECTION INTRAMUSCULAR; INTRAVENOUS; SUBCUTANEOUS at 09:01

## 2023-02-01 RX ADMIN — SODIUM CHLORIDE 100 MILLILITER(S): 9 INJECTION INTRAMUSCULAR; INTRAVENOUS; SUBCUTANEOUS at 06:35

## 2023-02-01 RX ADMIN — PANTOPRAZOLE SODIUM 40 MILLIGRAM(S): 20 TABLET, DELAYED RELEASE ORAL at 06:30

## 2023-02-01 RX ADMIN — Medication 75 MICROGRAM(S): at 06:31

## 2023-02-01 RX ADMIN — LISINOPRIL 10 MILLIGRAM(S): 2.5 TABLET ORAL at 06:35

## 2023-02-01 RX ADMIN — Medication 1000 MILLIGRAM(S): at 06:30

## 2023-02-01 RX ADMIN — Medication 1000 MILLIGRAM(S): at 13:57

## 2023-02-01 RX ADMIN — Medication 4: at 12:10

## 2023-02-01 RX ADMIN — Medication 1000 MILLIGRAM(S): at 07:00

## 2023-02-01 RX ADMIN — APIXABAN 2.5 MILLIGRAM(S): 2.5 TABLET, FILM COATED ORAL at 09:01

## 2023-02-01 NOTE — CASE MANAGEMENT PROGRESS NOTE - NSCMPROGRESSNOTE_GEN_ALL_CORE
Follow up transition to home with [facility]/PT plans with start of care 2/2/23 .  CM received a message from [facility] intake that they called pt. when she was home today  "Patient requested soc 2/4, per the ."  CM called ortho PA team and informed Snehal Ulloa.  CM available.

## 2023-02-01 NOTE — PROGRESS NOTE ADULT - REASON FOR ADMISSION
left hip pain--for left ant STAN
left hip DJD
Patient is a 77y old  Female who presents with a chief complaint of left hip pain (30 Jan 2023 18:01)
left hip pain--for left ant STAN
LENNY MUNGUIA

## 2023-02-01 NOTE — CASE MANAGEMENT PROGRESS NOTE - NSCMPROGRESSNOTE_GEN_ALL_CORE
Per treatment team pt. cleared to go home today. S/P left Anterior THR , creat 1.64 today.  CM met with pt and spouse Edward at bedside spouse to transport pt. and RW/Commode home today.  Meds to bed from [facility] pharmacy.  Given d/c instructions by primary nurse.   Discussed home care expectations and process with pt. who said the Home care called her today.  CM sent a message to [facility] HC /PT  requested start of care 2/2/23.- accepted.  Pt/family in agreement with transition plans.  CM following.

## 2023-02-01 NOTE — PROGRESS NOTE ADULT - ASSESSMENT
POD#1 s/p LEFT THR  - Pain control  - Bowel regimen  - PT/OT  - Incentive spirometry  - VTE PPx - Eliquis  - post-op LORENA   - f/u labs today - if Cr not back near baseline, will have to hold meloxicam    Type 2 DM  - A1c 8.4  - Diabetes NP consult reviewed - patient not interested in traditional BGM - tried in past more than once, wants to talk to PCP about CGM  - discussed post-op risks associated with hyperglycemia including impaired wound healing and increased risk of periprosthetic joint infections  - patient admittingly was making poor food choices recently and knows to change those  - she started eating better over last week or so and lost 4 lbs  - hold glimepiride, resume upon discharge  - medium dose sliding scale AC and HS  - Dr. Solitario not available 1/31, if patient still in house on 2/1, he will see  - patient will otherwise follow-up with Dr. 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  
Physical Exam:   Vital Signs Last 24 Hrs  T(C): 36.6 (01 Feb 2023 07:44), Max: 36.8 (31 Jan 2023 23:25)  T(F): 97.8 (01 Feb 2023 07:44), Max: 98.3 (31 Jan 2023 23:25)  HR: 69 (01 Feb 2023 07:44) (62 - 86)  BP: 130/70 (01 Feb 2023 07:44) (117/77 - 130/70)  BP(mean): --  RR: 20 (01 Feb 2023 07:44) (17 - 20)  SpO2: 97% (01 Feb 2023 07:44) (92% - 97%)    Parameters below as of 01 Feb 2023 07:44  Patient On (Oxygen Delivery Method): room air        General: NAD, denies Fever, chills  CVS: S1S2 no M/R/G  Resp: CTA in all fields  : no freq, no urgency, no dysuria  Extremeties: no edema, + pulses         CAPILLARY BLOOD GLUCOSE      POCT Blood Glucose.: 181 mg/dL (01 Feb 2023 07:57)  POCT Blood Glucose.: 252 mg/dL (31 Jan 2023 21:50)  POCT Blood Glucose.: 264 mg/dL (31 Jan 2023 16:33)  POCT Blood Glucose.: 369 mg/dL (31 Jan 2023 12:19)      Cholesterol, Serum: 113 mg/dL (05.19.21 @ 08:36)     HDL Cholesterol, Serum: 22 mg/dL (05.19.21 @ 08:36)     LDL Cholesterol Calculated: 66 mg/dL (05.19.21 @ 08:36)     DIET: CC  >50%

## 2023-02-01 NOTE — PROGRESS NOTE ADULT - SUBJECTIVE AND OBJECTIVE BOX
Discharge medication calendar:  Eliquis 2.5mg q12h x 4 weeks  APAP 1000mg q8h x 2-3 weeks  No NSAIDs (LORENA)  Omeprazole 20mg QAM x 4 weeks  Narcotic PRN  Docusate 100mg TID while taking narcotic  Miralax, Senna, or Bisacodyl PRN for treatment of constipation  
INTERVAL HPI/OVERNIGHT EVENTS:   Patient seen and examined.  In good spirits, couldn't get much sleep due to usual hospital sounds.  No fevers, chills, sweats, dizziness, HA, changes in vision, cp, palpitations, sob, persistent cough, n/v/d, abd pain, dysuria, focal weakness, or calf pain.     MEDICATIONS  (STANDING):  acetaminophen     Tablet .. 1000 milliGRAM(s) Oral every 8 hours  allopurinol 300 milliGRAM(s) Oral at bedtime  apixaban 2.5 milliGRAM(s) Oral <User Schedule>  atorvastatin 10 milliGRAM(s) Oral at bedtime  dextrose 5%. 1000 milliLiter(s) (100 mL/Hr) IV Continuous <Continuous>  dextrose 5%. 1000 milliLiter(s) (50 mL/Hr) IV Continuous <Continuous>  dextrose 50% Injectable 25 Gram(s) IV Push once  dextrose 50% Injectable 12.5 Gram(s) IV Push once  dextrose 50% Injectable 25 Gram(s) IV Push once  glucagon  Injectable 1 milliGRAM(s) IntraMuscular once  insulin lispro (ADMELOG) corrective regimen sliding scale   SubCutaneous three times a day before meals  insulin lispro (ADMELOG) corrective regimen sliding scale   SubCutaneous at bedtime  lactated ringers. 1000 milliLiter(s) (100 mL/Hr) IV Continuous <Continuous>  letrozole 2.5 milliGRAM(s) Oral daily  levothyroxine 75 MICROGram(s) Oral daily  meloxicam 15 milliGRAM(s) Oral daily  pantoprazole    Tablet 40 milliGRAM(s) Oral before breakfast  polyethylene glycol 3350 17 Gram(s) Oral at bedtime  senna 2 Tablet(s) Oral at bedtime    MEDICATIONS  (PRN):  dextrose Oral Gel 15 Gram(s) Oral once PRN Blood Glucose LESS THAN 70 milliGRAM(s)/deciliter  HYDROmorphone  Injectable 0.5 milliGRAM(s) IV Push every 3 hours PRN Severe Pain (7 - 10)  magnesium hydroxide Suspension 30 milliLiter(s) Oral daily PRN Constipation  ondansetron Injectable 4 milliGRAM(s) IV Push every 6 hours PRN Nausea and/or Vomiting  oxyCODONE    IR 5 milliGRAM(s) Oral every 3 hours PRN Moderate Pain (4 - 6)  oxyCODONE    IR 10 milliGRAM(s) Oral every 3 hours PRN Severe Pain (7 - 10)      REVIEW OF SYSTEMS:  See HPI,  all others negative    PHYSICAL EXAM:  Vital Signs Last 24 Hrs  T(C): 36.4 (31 Jan 2023 07:40), Max: 36.8 (30 Jan 2023 11:34)  T(F): 97.5 (31 Jan 2023 07:40), Max: 98.2 (30 Jan 2023 11:34)  HR: 72 (31 Jan 2023 07:40) (62 - 81)  BP: 144/83 (31 Jan 2023 07:40) (100/73 - 150/75)  BP(mean): --  RR: 20 (31 Jan 2023 07:40) (15 - 20)  SpO2: 96% (31 Jan 2023 07:40) (96% - 100%)    Parameters below as of 31 Jan 2023 07:40  Patient On (Oxygen Delivery Method): room air    GENERAL: NAD, well-groomed, well-developed, awake, alert, oriented x 3, fluent and coherent speech  EYES: EOMI, PERRLA, conjunctiva and sclera clear  ENMT: No tonsillar erythema, exudates, or enlargement; Moist mucous membranes,   No lesions seen on oral mucosa  NECK: Supple, No JVD, No Cervical LAD   NERVOUS SYSTEM:  Good concentration; Moving all 4 extremities against gravity ; No gross sensory deficits, No facial droop  CHEST/LUNG: Clear to auscultation bilaterally with good air entry; No rales, rhonchi, wheezing, or rubs  HEART: Regular rate and rhythm; No murmurs, rubs, or gallops  ABDOMEN: Soft, Nontender, Nondistended, Bowel sounds present, No palpable masses or organomegaly, No bruits  EXTREMITIES:  2+ Peripheral Pulses, No clubbing, cyanosis, or edema, no calf tenderness in either leg  WOUND: dressing c/d/i, periwound erythema/edema within expected limits    Diagnostic Testing:             labs pending today               
Procedure:  Left Anterior THR  POD#: 2    S: Pt without complaints. No SOB,CP, N/V. Tolerated Fluids / Diet well.   Pain comfortable on Tylenol + Celebrex. No oxy used so far. No BM yet  + flatus, No abdominal pain.  Stayed extra day due to elev creat which has improved.  Pain Rx:   acetaminophen     Tablet .. 1000 milliGRAM(s) Oral every 8 hours  HYDROmorphone  Injectable 0.5 milliGRAM(s) IV Push every 3 hours PRN  ondansetron Injectable 4 milliGRAM(s) IV Push every 6 hours PRN  oxyCODONE    IR 5 milliGRAM(s) Oral every 3 hours PRN  oxyCODONE    IR 10 milliGRAM(s) Oral every 3 hours PRN    O: General: Pt Alert and oriented, On exam NAD,   VS: Vital Signs Last 24 Hrs  T(C): 36.6 (01 Feb 2023 07:44), Max: 36.8 (31 Jan 2023 23:25)  T(F): 97.8 (01 Feb 2023 07:44), Max: 98.3 (31 Jan 2023 23:25)  HR: 69 (01 Feb 2023 07:44) (62 - 86)  BP: 130/70 (01 Feb 2023 07:44) (117/77 - 130/70)  BP(mean): --  RR: 20 (01 Feb 2023 07:44) (17 - 20)  SpO2: 97% (01 Feb 2023 07:44) (92% - 97%)    Parameters below as of 01 Feb 2023 07:44  Patient On (Oxygen Delivery Method): room air      Heart: RRR  Lungs: BS clear bilat.  Abdomen: Soft; no distention, benign exam    Ext: Left hip silverlon clean and dry  Neurologic: Has sensation bilat. feet & toes ;  Full AROM bilat feet & toes. EHL / AT  = Bilat: 5/5 ; Sensation Present mid lateral thigh  Vascular: Feet toes warm, pink. DP = 2+. Calves soft ; w/o tenderness bilat..  VTEP: On Bilat. Venodynes +   apixaban 2.5 milliGRAM(s) Oral every 12 hours    H.O Prophylaxis: Celebrex 200mg BID - Goal 21 Days   Activity in PT Noted.  Walked 150' and did 10 stairs                          11.4   14.08 )-----------( 158      ( 01 Feb 2023 06:00 )             34.5     02-01    144  |  113<H>  |  40<H>  ----------------------------<  216<H>  4.1   |  21<L>  |  1.42<H>    Ca    8.2<L>      01 Feb 2023 06:00        Hospitalist input noted    Primary Orthopedic Assessment:  • Stable from Orthopedic perspective  • Neuro motor exam stable:   • Labs: improved creatinine but still elevated      Plan:   • Continue:  PT/OT/Weightbearing as tolerated with assistance of a walker/Anterior THR precautions/Ice to hip/          Incentive spirometry encouraged / Celebrex for HO PPX  • Continue DVT prophylaxis as prescribed, including use of compression devices and ankle pumps  • Continue Pain Rx  • Anterior hip precautions reviewed with patient  • Plans per Medicine  • Discharge planning – anticipated discharge is Home D/C with home care & home PT when medically stable--today . Pt has been cleared by PT    
Procedure:  Left Anterior THR  POD#:1     S: Pt without complaints. No SOB,CP, N/V. Tolerated Fluids / Diet well.   Pain comfortable on Tylenol + meloxicam.  No oxy used as yet. No BM yet  + flatus, No abdominal pain.  voiding via primafit  Pain Rx:   acetaminophen     Tablet .. 1000 milliGRAM(s) Oral every 8 hours  HYDROmorphone  Injectable 0.5 milliGRAM(s) IV Push every 3 hours PRN  meloxicam 15 milliGRAM(s) Oral daily  ondansetron Injectable 4 milliGRAM(s) IV Push every 6 hours PRN  oxyCODONE    IR 5 milliGRAM(s) Oral every 3 hours PRN  oxyCODONE    IR 10 milliGRAM(s) Oral every 3 hours PRN    O: General: Pt Alert and oriented, On exam NAD,   VS: Vital Signs Last 24 Hrs  T(C): 36.4 (31 Jan 2023 07:40), Max: 36.8 (30 Jan 2023 11:34)  T(F): 97.5 (31 Jan 2023 07:40), Max: 98.2 (30 Jan 2023 11:34)  HR: 72 (31 Jan 2023 07:40) (62 - 81)  BP: 144/83 (31 Jan 2023 07:40) (100/73 - 150/75)  BP(mean): --  RR: 20 (31 Jan 2023 07:40) (15 - 20)  SpO2: 96% (31 Jan 2023 07:40) (96% - 100%)    Parameters below as of 31 Jan 2023 07:40  Patient On (Oxygen Delivery Method): room air      Heart: RRR  Lungs: BS clear bilat.  Abdomen: Soft; no distention, benign exam    Ext: Left Ant. Hip: silverlon clean and dry  Neurologic: Has sensation bilat. feet & toes ;  Full AROM bilat feet & toes. EHL / AT  = Bilat: 5/5 ; Sensation Present mid lateral thigh  Vascular: Feet toes warm, pink. DP = 2+. Calves soft ; w/o tenderness bilat..  VTEP: On Bilat. Venodynes +   apixaban 2.5 milliGRAM(s) Oral every 12 hrs    H.O Prophylaxis: meloxicam (sulfa allergy)  Activity in PT Noted: unable to walk due to numbness                          13.0   13.36 )-----------( 169      ( 30 Jan 2023 20:00 )             39.1     01-30    134<L>  |  99  |  31<H>  ----------------------------<  362<H>  4.2   |  27  |  1.68<H>    Ca    8.3<L>      30 Jan 2023 20:00        Hospitalist input noted    Primary Orthopedic Assessment:  • Stable from Orthopedic perspective  • Neuro motor exam stable:   • Labs: today's labs just drawn      Plan:   • Continue:  PT/OT/Weightbearing as tolerated with assistance of a walker/Anterior THR precautions/Ice to hip/          Incentive spirometry encouraged / meloxicam for HO PPX  • Continue DVT prophylaxis as prescribed, including use of compression devices and ankle pumps  • Continue Pain Rx  • Anterior hip precautions reviewed with patient  • Plans per Medicine   • Discharge planning – anticipated discharge is Home D/C with home care & home PT  when medically stable & cleared by PT/OT--today    
Patient is a 77y old  Female who presents with a chief complaint of L H OA (01 Feb 2023 09:53)      INTERVAL HPI/OVERNIGHT EVENTS:  Patient seen and examined in am, feels well, able to ambulate with walker, pain is well controlled. Denies dizziness fever, chills, nausea, vomiting.     ROS reviewed and is otherwise negative        Vital Signs Last 24 Hrs  T(C): 36.6 (01 Feb 2023 07:44), Max: 36.8 (31 Jan 2023 23:25)  T(F): 97.8 (01 Feb 2023 07:44), Max: 98.3 (31 Jan 2023 23:25)  HR: 69 (01 Feb 2023 07:44) (62 - 86)  BP: 130/70 (01 Feb 2023 07:44) (117/77 - 130/70)  BP(mean): --  RR: 20 (01 Feb 2023 07:44) (17 - 20)  SpO2: 97% (01 Feb 2023 07:44) (92% - 97%)    Parameters below as of 01 Feb 2023 07:44  Patient On (Oxygen Delivery Method): room air        PHYSICAL EXAM:  GENERAL: NAD, well-groomed obese  HEAD:  Atraumatic, Normocephalic  EYES: EOMI, PERRLA  ENMT: Moist mucous membranes,  No lesions  NECK: Supple, No JVD, Normal thyroid  NERVOUS SYSTEM:  Alert & Oriented X3, no focal deficits noted  CHEST/LUNG: Clear to auscultation bilaterally; No rales, rhonchi, wheezing, or rubs  HEART: Regular rate and rhythm; No murmurs, rubs, or gallops  ABDOMEN: Soft, Nontender, Nondistended; Bowel sounds present  EXTREMITIES:  + Pulses, no edema noted, left hip tender  SKIN: No rashes or lesions    MEDICATIONS  (STANDING):  acetaminophen     Tablet .. 1000 milliGRAM(s) Oral every 8 hours  allopurinol 300 milliGRAM(s) Oral at bedtime  apixaban 2.5 milliGRAM(s) Oral every 12 hours  atorvastatin 10 milliGRAM(s) Oral at bedtime  dextrose 5%. 1000 milliLiter(s) (100 mL/Hr) IV Continuous <Continuous>  dextrose 5%. 1000 milliLiter(s) (50 mL/Hr) IV Continuous <Continuous>  dextrose 50% Injectable 25 Gram(s) IV Push once  dextrose 50% Injectable 12.5 Gram(s) IV Push once  dextrose 50% Injectable 25 Gram(s) IV Push once  glucagon  Injectable 1 milliGRAM(s) IntraMuscular once  insulin lispro (ADMELOG) corrective regimen sliding scale   SubCutaneous three times a day before meals  insulin lispro (ADMELOG) corrective regimen sliding scale   SubCutaneous at bedtime  letrozole 2.5 milliGRAM(s) Oral daily  levothyroxine 75 MICROGram(s) Oral daily  lisinopril 10 milliGRAM(s) Oral daily  pantoprazole    Tablet 40 milliGRAM(s) Oral before breakfast  polyethylene glycol 3350 17 Gram(s) Oral at bedtime  senna 2 Tablet(s) Oral at bedtime  sodium chloride 0.9%. 1000 milliLiter(s) (100 mL/Hr) IV Continuous <Continuous>    MEDICATIONS  (PRN):  bisacodyl Suppository 10 milliGRAM(s) Rectal once PRN Constipation  dextrose Oral Gel 15 Gram(s) Oral once PRN Blood Glucose LESS THAN 70 milliGRAM(s)/deciliter  HYDROmorphone  Injectable 0.5 milliGRAM(s) IV Push every 3 hours PRN Severe Pain (7 - 10)  magnesium hydroxide Suspension 30 milliLiter(s) Oral daily PRN Constipation  ondansetron Injectable 4 milliGRAM(s) IV Push every 6 hours PRN Nausea and/or Vomiting  oxyCODONE    IR 5 milliGRAM(s) Oral every 3 hours PRN Moderate Pain (4 - 6)  oxyCODONE    IR 10 milliGRAM(s) Oral every 3 hours PRN Severe Pain (7 - 10)      Allergies    adhesives (Rash)  Bee venom (Anaphylaxis; Rash)  latex (Hives; Rash)  sulfa drugs (Rash)    Intolerances          LABS:                        11.4   14.08 )-----------( 158      ( 01 Feb 2023 06:00 )             34.5     01 Feb 2023 06:00    144    |  113    |  40     ----------------------------<  216    4.1     |  21     |  1.42     Ca    8.2        01 Feb 2023 06:00          CAPILLARY BLOOD GLUCOSE      POCT Blood Glucose.: 181 mg/dL (01 Feb 2023 07:57)  POCT Blood Glucose.: 252 mg/dL (31 Jan 2023 21:50)  POCT Blood Glucose.: 264 mg/dL (31 Jan 2023 16:33)  POCT Blood Glucose.: 369 mg/dL (31 Jan 2023 12:19)        
Patient is a 77y old  Female who presents with a chief complaint of left hip pain--for left ant STAN (01 Feb 2023 08:50)    updates: Type 2 DM, pt A&Ox4, feels well, L hip pain controlled. pt F/S elevated 370>283>369.264>252>181, pt receiving IV decadron 6mg, discussed effect of steroids on glycemic control, increased insulin to moderate ISS. reviewed past A1c on HIE w/ patient, noted >8% since 2021. reinforced importance of BGM, left message for PCP Dr. Celeste Simeon to discuss medication regimen modification, discussed w/ patient about adding GLP RA. reviewed Hx of elevated creatinine, effect of hyperglycemia on kidneys. gave patient list of [facility] endocrinologists, pt states she will call insurance company to find one    HPI:      PAST MEDICAL & SURGICAL HISTORY:  Essential hypertension      Hypothyroidism      Gout      Prolapsed uterus      Malignant neoplasm of upper-outer quadrant of right female breast, unspecified estrogen receptor status      COVID-19 vaccine series completed      Hyperlipidemia      Type 2 diabetes mellitus      History of lumpectomy of right breast      Osteoarthritis      Urge incontinence      History of hip replacement, total, right  2007      Status post hysterectomy  Partial 1997      History of right breast biopsy  3/1/19      Allergies    adhesives (Rash)  Bee venom (Anaphylaxis; Rash)  latex (Hives; Rash)  sulfa drugs (Rash)    Intolerances        MEDICATIONS  (STANDING):  acetaminophen     Tablet .. 1000 milliGRAM(s) Oral every 8 hours  allopurinol 300 milliGRAM(s) Oral at bedtime  apixaban 2.5 milliGRAM(s) Oral every 12 hours  atorvastatin 10 milliGRAM(s) Oral at bedtime  dextrose 5%. 1000 milliLiter(s) (100 mL/Hr) IV Continuous <Continuous>  dextrose 5%. 1000 milliLiter(s) (50 mL/Hr) IV Continuous <Continuous>  dextrose 50% Injectable 25 Gram(s) IV Push once  dextrose 50% Injectable 12.5 Gram(s) IV Push once  dextrose 50% Injectable 25 Gram(s) IV Push once  glucagon  Injectable 1 milliGRAM(s) IntraMuscular once  insulin lispro (ADMELOG) corrective regimen sliding scale   SubCutaneous three times a day before meals  insulin lispro (ADMELOG) corrective regimen sliding scale   SubCutaneous at bedtime  letrozole 2.5 milliGRAM(s) Oral daily  levothyroxine 75 MICROGram(s) Oral daily  lisinopril 10 milliGRAM(s) Oral daily  pantoprazole    Tablet 40 milliGRAM(s) Oral before breakfast  polyethylene glycol 3350 17 Gram(s) Oral at bedtime  senna 2 Tablet(s) Oral at bedtime  sodium chloride 0.9%. 1000 milliLiter(s) (100 mL/Hr) IV Continuous <Continuous>

## 2023-02-01 NOTE — PROGRESS NOTE ADULT - PROBLEM SELECTOR PLAN 1
Type 2 A1c 8.4% adm L hip OA  Recommend endocrine-[facility] onconsult  FU appt: Dr. Celeste Simeon May 12, 2023 11:45  DSC recommendations: return to home regimen with diet modification and glucose monitoring  diabetes education provided as documented above  Diabetes support info and cell # [phone] given   Goal 100-180 mg/dL; 140-180 mg/dL in critical care areas

## 2023-02-02 ENCOUNTER — NON-APPOINTMENT (OUTPATIENT)
Age: 78
End: 2023-02-02

## 2023-02-16 ENCOUNTER — APPOINTMENT (OUTPATIENT)
Dept: ORTHOPEDIC SURGERY | Facility: CLINIC | Age: 78
End: 2023-02-16
Payer: MEDICARE

## 2023-02-16 VITALS — DIASTOLIC BLOOD PRESSURE: 81 MMHG | HEART RATE: 90 BPM | SYSTOLIC BLOOD PRESSURE: 126 MMHG

## 2023-02-16 PROCEDURE — 73502 X-RAY EXAM HIP UNI 2-3 VIEWS: CPT | Mod: LT

## 2023-02-16 PROCEDURE — 99024 POSTOP FOLLOW-UP VISIT: CPT

## 2023-02-16 RX ORDER — AMOXICILLIN 500 MG/1
500 CAPSULE ORAL
Qty: 12 | Refills: 2 | Status: ACTIVE | COMMUNITY
Start: 2023-02-16 | End: 1900-01-01

## 2023-02-16 NOTE — HISTORY OF PRESENT ILLNESS
[___ Weeks Post Op] : [unfilled] weeks post op [1] : the patient reports pain that is 1/10 in severity [Clean/Dry/Intact] : clean, dry and intact [Swelling] : swollen [Neuro Intact] : an unremarkable neurological exam [Vascular Intact] : ~T peripheral vascular exam normal [Negative Danna's] : maneuvers demonstrated a negative Danna's sign [Xray (Date:___)] : [unfilled] Xray -  [Doing Well] : is doing well [No Sign of Infection] : is showing no signs of infection [Adequate Pain Control] : has adequate pain control [Chills] : no chills [Constipation] : no constipation [Diarrhea] : no diarrhea [Dysuria] : no dysuria [Fever] : no fever [Nausea] : no nausea [Vomiting] : no vomiting [Erythema] : not erythematous [Discharge] : absent of discharge [Dehiscence] : not dehisced [de-identified] : The patient is a 77 year old female S/P left anterior total hip replacement performed at [facility] on 1/30/23. Patient presents today for her first post operative visit and Dermabond tape / suture removal. [de-identified] : The patient was discharged from the hospital with home physical therapy and home exercises. She verbalized feeling well overall. The patient reports current surgical pain of 1/10. She is using Oxycodone as needed and Tylenol TID as prescribed for pain relief. Patient is using Eliquis 2.5 MG twice a day for DVT prophylaxis. She denies treatment for for prevention of heterotopic bone ossification. She is ambulating safely with a cane. [de-identified] : The patient has stable mild antalgic gait utilizing a cane. The left hip is with Dermabond tape /and 2 sutures intact. The incision site is clean, dry and intact without redness, drainage or heat. There is no palpable tenderness, hematoma or effusion. The left hip actively flexes to 90 degrees with minimal discomfort. The external and internal rotation of the left hip is minimal discomfort and stiffness. Leg lengths appear equal. There is no evidence of infection or cellulitis. [de-identified] : 1 view of the left hip and pelvis were obtained. X-rays reveal a well-positioned, well fit, total hip replacement in excellent alignment. No obvious evidence of fractures, dislocation or osteolysis noted. [de-identified] : Left anterior total hip replacement [de-identified] : Dermabond tape and the sutures were removed from the left hip incision and replaced with Steri-Strips. The patient tolerated it well. Incisional care was reviewed with the patient. The patient was advised of the nature of the healing process. Patient was advised of the importance of adhering to the DVT prophylaxis protocol utilizing Eliquis as prescribed until 4 weeks post operative. The patient was advised to continue physical therapy and home exercises as recommended. Prescription was given to patient for antibiotic Amoxicillin for dental prophylaxis as per protocol. Signs and symptoms of infection were reviewed with the patient. Anterior total hip precautions were reviewed with the patient and advised to continue to maintain the precautions until 6 weeks post operative, then start to slowly release. Patient will make an appointment to follow up with Dr. Arthur in six weeks. Patient verbalized understanding of all instructions. All her questions were addressed to her satisfaction. The patient was advised to call the office at any time with new questions or concerns.

## 2023-03-03 NOTE — DISCHARGE NOTE NURSING/CASE MANAGEMENT/SOCIAL WORK - NSDCPEELIQUIS_GEN_ALL_CORE
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 Medications and allergies reviewed via imbookin ([place]). Assessment deferred to MD. Per Dr. Lopez - needs to continue diuretics off potassium supplementation, then have BMP rechecked in 1 week, refer to psych, liver ultrasound in 6 months, follow up in 6 months with labs (CMP, CBC, INR, AFP). Plan reviewed with patient via , patient verbalized understanding. Orders entered.   Apixaban/Eliquis - Compliance/Apixaban/Eliquis - Dietary Advice/Apixaban/Eliquis - Follow up monitoring/Apixaban/Eliquis - Potential for adverse drug reactions and interactions

## 2023-03-28 ENCOUNTER — APPOINTMENT (OUTPATIENT)
Dept: ORTHOPEDIC SURGERY | Facility: CLINIC | Age: 78
End: 2023-03-28
Payer: MEDICARE

## 2023-03-28 VITALS
BODY MASS INDEX: 27.29 KG/M2 | SYSTOLIC BLOOD PRESSURE: 117 MMHG | WEIGHT: 154 LBS | HEIGHT: 63 IN | HEART RATE: 77 BPM | DIASTOLIC BLOOD PRESSURE: 76 MMHG

## 2023-03-28 DIAGNOSIS — M25.559 PAIN IN UNSPECIFIED HIP: ICD-10-CM

## 2023-03-28 DIAGNOSIS — Z96.641 PRESENCE OF RIGHT ARTIFICIAL HIP JOINT: ICD-10-CM

## 2023-03-28 DIAGNOSIS — Z96.642 PRESENCE OF LEFT ARTIFICIAL HIP JOINT: ICD-10-CM

## 2023-03-28 PROCEDURE — 73502 X-RAY EXAM HIP UNI 2-3 VIEWS: CPT | Mod: LT

## 2023-03-28 PROCEDURE — 99024 POSTOP FOLLOW-UP VISIT: CPT

## 2023-03-28 RX ORDER — METHYLPREDNISOLONE 4 MG/1
4 TABLET ORAL
Qty: 1 | Refills: 0 | Status: ACTIVE | COMMUNITY
Start: 2023-03-28 | End: 1900-01-01

## 2023-03-28 NOTE — HISTORY OF PRESENT ILLNESS
[___ Weeks Post Op] : [unfilled] weeks post op [5] : the patient reports pain that is 5/10 in severity [Doing Well] : is doing well [Clean/Dry/Intact] : clean, dry and intact [Healed] : healed [Neuro Intact] : an unremarkable neurological exam [Vascular Intact] : ~T peripheral vascular exam normal [Negative Danna's] : maneuvers demonstrated a negative Danna's sign [Xray (Date:___)] : [unfilled] Xray -  [Chills] : no chills [Fever] : no fever [Erythema] : not erythematous [Discharge] : absent of discharge [Swelling] : not swollen [Dehiscence] : not dehisced [de-identified] : S/P Left THR DOS 1/30/23, S/P Right THR DOS 3/1/2006 [de-identified] : 78-year-old female presents for follow-up of the left hip status post left total hip replacement on 1/30/2023.  Overall in the postoperative period patient is doing well.  She is experiencing pain rated as a 5 out of 10 which is worse with use.  She is doing physical therapy at home however she is unable to do outpatient due to insurance issues at this time.  She is doing her own exercises at home with some improvement in symptoms.  She is taking Tylenol as needed with improvement in symptoms.  She is also experiencing 3-4 out of 10 pain in the right hip worse with use after the surgery.  The pain is localized to the anterior thigh b/l. Denies any injuries to that hip.\par Denies fevers, chills, chest pain, calf pain, SOB [de-identified] : The incision site is clean, dry and intact without redness, drainage or heat. There is no palpable tenderness, hematoma or effusion. The left hip actively flexes to 90 degrees with minimal discomfort. The external and internal rotation of the left hip is minimal discomfort and stiffness. Leg lengths appear equal. There is no evidence of infection or cellulitis. [de-identified] : Radiographs of the pelvis and left hip were performed today. There are stable interfaces between bone and implant in the femur and acetabulum. There is stable positioning of the femoral and acetabular components. There is no fracture, foreign body, subsidement, osteolysis, or notable effusion. The lesser trochanters of each femur are aligned on Shenton's line. No heterotopic ossification is noted.\par   [de-identified] : Dr. Arthur evaluated this patient, reviewed the radiographs, and is guiding the patients orthopedic management. The patient was advised to continue their routine activities of daily living within tolerances, home exercises as guided by PT and continue out patient PT until goals are reached. Instructions for LE strengthening, ROM, and balance exercises were reviewed.\par A prescription for PT was given to the patient to continue out patient PT. \par The patient was advised to continue with regular interval orthopedic follow up post THR and may contact our office if any new problems arise for urgent orthopedic re-evaluation.\par All patients questions and concerns were addressed to satisfaction.\par Advised the patient to continue with antibiotics prior to dental procedures as per Dr. Arthur's protocol. \par A Medrol Dosepak was sent to the patient's pharmacy.  Reviewed risk benefits and alternatives to the medication as well as proper instructions for use. Advised patient she may have spinal stenosis and that is why she is experiencing pain in both hips. If patient does not get relief from medrol dose pack, advise patient to follow up with spine specialtist for further workup and management.

## 2023-06-08 ENCOUNTER — OUTPATIENT (OUTPATIENT)
Dept: OUTPATIENT SERVICES | Facility: HOSPITAL | Age: 78
LOS: 1 days | Discharge: HOME | End: 2023-06-08

## 2023-06-08 DIAGNOSIS — Z96.641 PRESENCE OF RIGHT ARTIFICIAL HIP JOINT: Chronic | ICD-10-CM

## 2023-06-08 DIAGNOSIS — Z90.710 ACQUIRED ABSENCE OF BOTH CERVIX AND UTERUS: Chronic | ICD-10-CM

## 2023-06-08 DIAGNOSIS — Z98.890 OTHER SPECIFIED POSTPROCEDURAL STATES: Chronic | ICD-10-CM

## 2023-06-08 DIAGNOSIS — C50.919 MALIGNANT NEOPLASM OF UNSPECIFIED SITE OF UNSPECIFIED FEMALE BREAST: ICD-10-CM

## 2023-06-12 ENCOUNTER — RX RENEWAL (OUTPATIENT)
Age: 78
End: 2023-06-12

## 2023-06-20 ENCOUNTER — APPOINTMENT (OUTPATIENT)
Dept: HEMATOLOGY ONCOLOGY | Facility: CLINIC | Age: 78
End: 2023-06-20
Payer: MEDICARE

## 2023-06-20 VITALS
RESPIRATION RATE: 16 BRPM | TEMPERATURE: 97.1 F | HEART RATE: 80 BPM | OXYGEN SATURATION: 97 % | DIASTOLIC BLOOD PRESSURE: 89 MMHG | SYSTOLIC BLOOD PRESSURE: 151 MMHG | BODY MASS INDEX: 25.38 KG/M2 | WEIGHT: 143.3 LBS

## 2023-06-20 PROCEDURE — 99214 OFFICE O/P EST MOD 30 MIN: CPT

## 2023-06-20 RX ORDER — GABAPENTIN 300 MG/1
300 CAPSULE ORAL
Qty: 90 | Refills: 1 | Status: DISCONTINUED | COMMUNITY
Start: 2021-12-16 | End: 2023-06-20

## 2023-06-20 NOTE — PHYSICAL EXAM
[Fully active, able to carry on all pre-disease performance without restriction] : Status 0 - Fully active, able to carry on all pre-disease performance without restriction [Normal] : affect appropriate [de-identified] : Stable seroma in right breast

## 2023-06-20 NOTE — HISTORY OF PRESENT ILLNESS
[Disease: _____________________] : Disease: [unfilled] [T: ___] : T[unfilled] [N: ___] : N[unfilled] [M: ___] : M[unfilled] [AJCC Stage: ____] : AJCC Stage: [unfilled] [de-identified] : Right breast cancer at age 74\par 01/23/19 Right breast sono guided core bx revealed a 0.7 cm invasive mammary carcinoma with ductal and lobular features, SBR 6/9, ER 80%, HI 60%, HER-2(2+) FISH-\par 02/06/19 MRI of the breast showed the biopsy clip with minimal associated enhancement is noted, likely from the biopsy proven malignancy. Additional indeterminate enhancing nodule in the lateral right breast, for which biopsy was advised. \par 03/01/19 Right mammo localized wide resection revealed a 1.05 cm invasive carcinoma with ductal and lobular features, SBR 6-7/9 with LVI, 0/2 LN involved. LCIS, pleomorphic      type and focal ADH. Right inferior margin, lateral margin, anterior and superior margin revealed focal pleomorphic LCIS and  ALH in inferior, lateral and deep margins. All     margins uninvolved by invasive carcinoma.\par 03/21/19 Oncotype Score of 20 with distant recurrence risk at 9 years with AI or BRAXTON alone is 6%\par 04/19/19 Right breast wide resection revealed LCIS, classic and pleomorphic types. No invasive carcinoma, DCIS or lymphovascular invasion identified. The resection margins are negative for carcinoma. Pleomorphic LCIS is 0.8 mm from the nearest margin (medial).\par 5/2019 Adjuvant radiation with Dr. Rinaldi\par 7/19 - 10/19 Anastrozole discontinued due to severe hot flashes\par 10/19 Letrozole started by Dr. Adhikari\par 10/14/19 Right breast 10 o'clock axis fine needle aspiration was negative for malignancy\par  [de-identified] : 1.05 cm invasive carcinoma with ductal and lobular features, SBR 6-7/9, ER 80%, DE 60%, HER-2 ( 2+) FISH (-) with LVI  0/2 SLN, Oncotype = 20 (RR 6%) [de-identified] : Lorraine started letrozole in 10/19 and continues to tolerate it well aside from hot flashes which occur occasionally at night. \par She had an episode of vaginal bleeding in 5/22 and saw a new GYN Dr. Sam Huynh who evaluated her including a sonogram and found no abnormalities. No recurrent vaginal bleeding since then.\par She had a left hip replacement on 1/30/23 at [facility]. She had good results from her right hip replacement 17 years ago.\par Dr. Knapp retired so she will be going to see Dr. Bird this year.\par \par HEALTH MAINTENANCE:\par PCP: Celeste Simeon MD\par Mammogram: 1/2022  BI-RADS 2 at [facility]\par Breast MRI: 07/12/21 showed post lumpectomy and radiation changes in the right breast. \par Pap smear: 5/22 negative\par Bone density DEXA scan: 07/27/22 showed T scores of 0.8 in spine, -1.5 in femoral neck and -0.6 in forearm\par                                           07/30/20 showed T scores of 1.6 in spine, and -0.6 in femoral neck\par Colonoscopy: 3/2020 with no polyps\par \par

## 2023-08-02 NOTE — OB HISTORY
HPI:    Patient ID: Nishant Echeverria is a 70year old female. Pt here for evaluation of some transient mid abdominal and right upper abdominal pains with some sour taste in her mouth. No vommiting or nausea no bowel disturbance.     Review of Systems   G [Postmenopausal] : The patient is postmenopausal

## 2023-08-09 ENCOUNTER — APPOINTMENT (OUTPATIENT)
Dept: SURGICAL ONCOLOGY | Facility: CLINIC | Age: 78
End: 2023-08-09
Payer: MEDICARE

## 2023-08-09 VITALS
HEART RATE: 81 BPM | DIASTOLIC BLOOD PRESSURE: 75 MMHG | RESPIRATION RATE: 16 BRPM | OXYGEN SATURATION: 97 % | BODY MASS INDEX: 25.69 KG/M2 | HEIGHT: 63 IN | SYSTOLIC BLOOD PRESSURE: 158 MMHG | WEIGHT: 145 LBS

## 2023-08-09 PROCEDURE — 99204 OFFICE O/P NEW MOD 45 MIN: CPT

## 2023-08-31 NOTE — ADDENDUM
[FreeTextEntry1] : I, Kayla Hwang, acted solely as a scribe for Dr. Bryant Bird on this date 08/09/2023.

## 2023-08-31 NOTE — PHYSICAL EXAM
[Normal] : supple, no neck mass and thyroid not enlarged [Normal Neck Lymph Nodes] : normal neck lymph nodes  [Normal Supraclavicular Lymph Nodes] : normal supraclavicular lymph nodes [Normal Groin Lymph Nodes] : normal groin lymph nodes [Normal Axillary Lymph Nodes] : normal axillary lymph nodes [Normal] : oriented to person, place and time, with appropriate affect [de-identified] : no masses or adenopathy bilaterally.  us shows RUOQ post op seroma.

## 2023-08-31 NOTE — CONSULT LETTER
[Dear  ___] : Dear  [unfilled], [Consult Letter:] : I had the pleasure of evaluating your patient, [unfilled]. [Please see my note below.] : Please see my note below. [Sincerely,] : Sincerely, [FreeTextEntry3] : Bryant Bird MD FACS

## 2023-08-31 NOTE — HISTORY OF PRESENT ILLNESS
[de-identified] : Patient is a 77 y/o female who presents an initial consultation to establish care with a breast surgeon for continued surveillance of breast cancer (2019 Right IDC, T1cN0, ER+/NC+/HER2-).  S/p right breast lumpectomy 3/2019.  She completed adjuvant radiation 5/2019 with Dr. Rinaldi. Currently on Letrozole with Dr. Ramonita Bautista.  She denies any palpable breast masses, nipple discharge, inversion or skin change.   Most recent bilateral mammogram and sonogram performed 2/2023 revealed benign findings including 4 cm postop seroma (BI-RADS 2).   7/19 - 10/19 Anastrozole discontinued due to severe hot flashes  Oncotype Score of 20 with distant recurrence risk at 9 years with AI or BRAXTON alone is 6%  04/19/19 Right breast wide resection revealed LCIS, classic and pleomorphic types. No invasive carcinoma, DCIS or lymphovascular invasion identified. The resection margins are negative for carcinoma. Pleomorphic LCIS is 0.8 mm from the nearest margin (medial).  SURGEON: Dr. Knapp.  03/01/19 Right mammo localized wide resection revealed a 1.05 cm invasive carcinoma with ductal and lobular features, SBR 6-7/9 with LVI, 0/2 LN involved. LCIS, pleomorphic type and focal ADH. Right inferior margin, lateral margin, anterior and superior margin revealed focal pleomorphic LCIS and ALH in inferior, lateral and deep margins. All margins uninvolved by invasive carcinoma.  02/06/19 MRI of the breast showed the biopsy clip with minimal associated enhancement is noted, likely from the biopsy proven malignancy. Additional indeterminate enhancing nodule in the lateral right breast, for which biopsy was advised.  01/23/19 Right breast sono guided core bx revealed a 0.7 cm invasive mammary carcinoma with ductal and lobular features, SBR 6/9, ER 80%, NC 60%, HER-2(2+) FISH-  Patient has family history of breast cancer in her mother and maternal aunt.  Father was anesthesiologist who passed away @age 55.

## 2023-08-31 NOTE — ASSESSMENT
[FreeTextEntry1] : Right breast IDC, T1cN0 - 2019 S/p right breast lumpectomy and re-excision  Completed adjuvant radiation therapy  SHARA BI-RADS 2 imaging February 2023  Continue Letrozole as per Dr. Fowler of medical oncology Continue yearly breast imaging February 2024 RTO 6 months  Genetic testing performed today due to strong family history

## 2023-12-04 ENCOUNTER — OUTPATIENT (OUTPATIENT)
Dept: OUTPATIENT SERVICES | Facility: HOSPITAL | Age: 78
LOS: 1 days | Discharge: HOME | End: 2023-12-04

## 2023-12-04 DIAGNOSIS — C50.919 MALIGNANT NEOPLASM OF UNSPECIFIED SITE OF UNSPECIFIED FEMALE BREAST: ICD-10-CM

## 2023-12-04 DIAGNOSIS — Z96.641 PRESENCE OF RIGHT ARTIFICIAL HIP JOINT: Chronic | ICD-10-CM

## 2023-12-04 DIAGNOSIS — Z98.890 OTHER SPECIFIED POSTPROCEDURAL STATES: Chronic | ICD-10-CM

## 2023-12-04 DIAGNOSIS — Z90.710 ACQUIRED ABSENCE OF BOTH CERVIX AND UTERUS: Chronic | ICD-10-CM

## 2023-12-19 ENCOUNTER — APPOINTMENT (OUTPATIENT)
Dept: HEMATOLOGY ONCOLOGY | Facility: CLINIC | Age: 78
End: 2023-12-19
Payer: MEDICARE

## 2023-12-19 VITALS
WEIGHT: 145.5 LBS | RESPIRATION RATE: 16 BRPM | BODY MASS INDEX: 25.77 KG/M2 | HEART RATE: 68 BPM | DIASTOLIC BLOOD PRESSURE: 79 MMHG | OXYGEN SATURATION: 97 % | TEMPERATURE: 97.3 F | SYSTOLIC BLOOD PRESSURE: 145 MMHG

## 2023-12-19 PROCEDURE — 99214 OFFICE O/P EST MOD 30 MIN: CPT

## 2023-12-19 NOTE — PHYSICAL EXAM
[Fully active, able to carry on all pre-disease performance without restriction] : Status 0 - Fully active, able to carry on all pre-disease performance without restriction [Normal] : affect appropriate [de-identified] : Stable large seroma in upper outer right breast

## 2023-12-19 NOTE — HISTORY OF PRESENT ILLNESS
[Disease: _____________________] : Disease: [unfilled] [T: ___] : T[unfilled] [N: ___] : N[unfilled] [M: ___] : M[unfilled] [AJCC Stage: ____] : AJCC Stage: [unfilled] [de-identified] : Right breast cancer at age 74 01/23/19 Right breast sono guided core bx revealed a 0.7 cm invasive mammary carcinoma with ductal and lobular features, SBR 6/9, ER 80%, MS 60%, HER-2(2+) FISH- 02/06/19 MRI of the breast showed the biopsy clip with minimal associated enhancement is noted, likely from the biopsy proven malignancy. Additional indeterminate enhancing nodule in the lateral right breast, for which biopsy was advised.  03/01/19 Right mammo localized wide resection revealed a 1.05 cm invasive carcinoma with ductal and lobular features, SBR 6-7/9 with LVI, 0/2 LN involved. LCIS, pleomorphic      type and focal ADH. Right inferior margin, lateral margin, anterior and superior margin revealed focal pleomorphic LCIS and  ALH in inferior, lateral and deep margins. All     margins uninvolved by invasive carcinoma. 03/21/19 Oncotype Score of 20 with distant recurrence risk at 9 years with AI or BRAXTON alone is 6% 04/19/19 Right breast wide resection revealed LCIS, classic and pleomorphic types. No invasive carcinoma, DCIS or lymphovascular invasion identified. The resection margins are negative for carcinoma. Pleomorphic LCIS is 0.8 mm from the nearest margin (medial). 5/2019 Adjuvant radiation with Dr. Rinaldi 7/19 - 10/19 Anastrozole discontinued due to severe hot flashes 10/19 Letrozole started by Dr. Adhikari 10/14/19 Right breast 10 o'clock axis fine needle aspiration was negative for malignancy  [de-identified] : 1.05 cm invasive carcinoma with ductal and lobular features, SBR 6-7/9, ER 80%, [place] 60%, HER-2 (2+) FISH (-) with LVI  0/2 SLN, Oncotype = 20 (RR 6%) [de-identified] : Lorraine started letrozole in 10/19 (after taking anastrozole for 3 months) and continues to tolerate it well aside from hot flashes which occur occasionally at night. She is not having any issues with arthralgias. Recently she had some pain in the right breast which has started to improve. She had an episode of vaginal bleeding in 5/22 and saw a new GYN Dr. Sam Huynh who evaluated her including a sonogram and found no abnormalities. No recurrent vaginal bleeding since then. She had a left hip replacement on 1/30/23 at [facility]. She still has some numbness in her anterior left leg since the surgery. She had good results from her right hip replacement 17 years ago.  HEALTH MAINTENANCE: PCP: Celeste Simeon MD Mammogram: 2/21/23 BI-RADS 2 at [facility] Breast MRI: 07/12/21 showed post lumpectomy and radiation changes in the right breast.  Pap smear: 5/22 negative Bone density DEXA scan: 07/27/22 showed T scores of 0.8 in spine, -1.5 in femoral neck and -0.6 in forearm                                           07/30/20 showed T scores of 1.6 in spine, and -0.6 in femoral neck Colonoscopy: 3/2020 with no polyps

## 2024-03-27 ENCOUNTER — APPOINTMENT (OUTPATIENT)
Dept: SURGICAL ONCOLOGY | Facility: CLINIC | Age: 79
End: 2024-03-27
Payer: MEDICARE

## 2024-03-27 VITALS
OXYGEN SATURATION: 98 % | SYSTOLIC BLOOD PRESSURE: 140 MMHG | DIASTOLIC BLOOD PRESSURE: 79 MMHG | HEART RATE: 80 BPM | HEIGHT: 63 IN | WEIGHT: 145 LBS | BODY MASS INDEX: 25.69 KG/M2

## 2024-03-27 PROCEDURE — 99215 OFFICE O/P EST HI 40 MIN: CPT

## 2024-03-30 NOTE — ASSESSMENT
[FreeTextEntry1] : Right breast IDC, T1cN0 - 2019 S/p right breast lumpectomy and re-excision  Completed adjuvant radiation therapy  SHARA BI-RADS 2 imaging February 2024 Continue Letrozole as per Dr. Fowler of medical oncology Continue yearly breast imaging February 2025  RTO 1 year

## 2024-03-30 NOTE — PHYSICAL EXAM
[Normal] : supple, no neck mass and thyroid not enlarged [Normal Neck Lymph Nodes] : normal neck lymph nodes  [Normal Supraclavicular Lymph Nodes] : normal supraclavicular lymph nodes [Normal Groin Lymph Nodes] : normal groin lymph nodes [Normal Axillary Lymph Nodes] : normal axillary lymph nodes [Normal] : oriented to person, place and time, with appropriate affect [de-identified] : right breast lumpectomy scar healed w/ post radiation changes. no masses or adenopathy bilaterally

## 2024-03-30 NOTE — ADDENDUM
[FreeTextEntry1] : I, Kayla Hwang, acted solely as a scribe for Dr. Bryant Bird on this date 03/27/2024.

## 2024-03-30 NOTE — HISTORY OF PRESENT ILLNESS
[de-identified] : Patient is a 77 y/o female who presents a follow up.  Hx of breast cancer (2019 Right IDC, T1cN0, ER+/ND+/HER2-).  S/p right breast lumpectomy 3/2019.  She completed adjuvant radiation 5/2019 with Dr. Rinaldi. Currently on Letrozole with Dr. Ramonita Bautista.  She denies any palpable breast masses, nipple discharge, inversion or skin change.   B/L mammo/sono (2/22/24): BI-RADS 2  Bilateral mammogram and sonogram performed 2/2023 revealed benign findings including 4 cm postop seroma (BI-RADS 2).   7/19 - 10/19 Anastrozole discontinued due to severe hot flashes  Oncotype Score of 20 with distant recurrence risk at 9 years with AI or BRAXTON alone is 6%  04/19/19 Right breast wide resection revealed LCIS, classic and pleomorphic types. No invasive carcinoma, DCIS or lymphovascular invasion identified. The resection margins are negative for carcinoma. Pleomorphic LCIS is 0.8 mm from the nearest margin (medial).  SURGEON: Dr. Knapp.  03/01/19 Right mammo localized wide resection revealed a 1.05 cm invasive carcinoma with ductal and lobular features, SBR 6-7/9 with LVI, 0/2 LN involved. LCIS, pleomorphic type and focal ADH. Right inferior margin, lateral margin, anterior and superior margin revealed focal pleomorphic LCIS and ALH in inferior, lateral and deep margins. All margins uninvolved by invasive carcinoma.  02/06/19 MRI of the breast showed the biopsy clip with minimal associated enhancement is noted, likely from the biopsy proven malignancy. Additional indeterminate enhancing nodule in the lateral right breast, for which biopsy was advised.  01/23/19 Right breast sono guided core bx revealed a 0.7 cm invasive mammary carcinoma with ductal and lobular features, SBR 6/9, ER 80%, ND 60%, HER-2(2+) FISH-  Patient has family history of breast cancer in her mother and maternal aunt.  Father was anesthesiologist who passed away @age 55.  Genetic testing 8/2023 NEGATIVE.

## 2024-04-12 RX ORDER — GABAPENTIN 100 MG/1
100 CAPSULE ORAL
Qty: 30 | Refills: 1 | Status: ACTIVE | COMMUNITY
Start: 2024-04-12 | End: 1900-01-01

## 2024-06-13 ENCOUNTER — OUTPATIENT (OUTPATIENT)
Dept: OUTPATIENT SERVICES | Facility: HOSPITAL | Age: 79
LOS: 1 days | Discharge: HOME | End: 2024-06-13

## 2024-06-13 DIAGNOSIS — Z96.641 PRESENCE OF RIGHT ARTIFICIAL HIP JOINT: Chronic | ICD-10-CM

## 2024-06-13 DIAGNOSIS — C50.919 MALIGNANT NEOPLASM OF UNSPECIFIED SITE OF UNSPECIFIED FEMALE BREAST: ICD-10-CM

## 2024-06-13 DIAGNOSIS — Z90.710 ACQUIRED ABSENCE OF BOTH CERVIX AND UTERUS: Chronic | ICD-10-CM

## 2024-06-13 DIAGNOSIS — Z98.890 OTHER SPECIFIED POSTPROCEDURAL STATES: Chronic | ICD-10-CM

## 2024-06-18 ENCOUNTER — APPOINTMENT (OUTPATIENT)
Dept: HEMATOLOGY ONCOLOGY | Facility: CLINIC | Age: 79
End: 2024-06-18
Payer: MEDICARE

## 2024-06-18 VITALS
SYSTOLIC BLOOD PRESSURE: 133 MMHG | WEIGHT: 149.91 LBS | TEMPERATURE: 97.9 F | BODY MASS INDEX: 26.56 KG/M2 | HEART RATE: 93 BPM | DIASTOLIC BLOOD PRESSURE: 82 MMHG | OXYGEN SATURATION: 99 % | RESPIRATION RATE: 16 BRPM

## 2024-06-18 DIAGNOSIS — T45.1X5A FLUSHING: ICD-10-CM

## 2024-06-18 DIAGNOSIS — R23.2 FLUSHING: ICD-10-CM

## 2024-06-18 DIAGNOSIS — C50.911 MALIGNANT NEOPLASM OF UNSPECIFIED SITE OF RIGHT FEMALE BREAST: ICD-10-CM

## 2024-06-18 DIAGNOSIS — M85.80 OTHER SPECIFIED DISORDERS OF BONE DENSITY AND STRUCTURE, UNSPECIFIED SITE: ICD-10-CM

## 2024-06-18 PROCEDURE — 99214 OFFICE O/P EST MOD 30 MIN: CPT

## 2024-06-18 PROCEDURE — G2211 COMPLEX E/M VISIT ADD ON: CPT

## 2024-06-18 RX ORDER — LETROZOLE TABLETS 2.5 MG/1
2.5 TABLET, FILM COATED ORAL
Qty: 90 | Refills: 1 | Status: DISCONTINUED | COMMUNITY
Start: 2021-12-14 | End: 2024-06-18

## 2024-06-18 NOTE — HISTORY OF PRESENT ILLNESS
[Disease: _____________________] : Disease: [unfilled] [T: ___] : T[unfilled] [N: ___] : N[unfilled] [M: ___] : M[unfilled] [AJCC Stage: ____] : AJCC Stage: [unfilled] [de-identified] : Right breast cancer at age 74 01/23/19 Right breast sono guided core bx revealed a 0.7 cm invasive mammary carcinoma with ductal and lobular features, SBR 6/9, ER 80%, IA 60%, HER-2(2+) FISH- 02/06/19 MRI of the breast showed the biopsy clip with minimal associated enhancement is noted, likely from the biopsy proven malignancy. Additional indeterminate enhancing nodule in the lateral right breast, for which biopsy was advised.  03/01/19 Right mammo localized wide resection revealed a 1.05 cm invasive carcinoma with ductal and lobular features, SBR 6-7/9 with LVI, 0/2 LN involved. LCIS, pleomorphic      type and focal ADH. Right inferior margin, lateral margin, anterior and superior margin revealed focal pleomorphic LCIS and  ALH in inferior, lateral and deep margins. All     margins uninvolved by invasive carcinoma. 03/21/19 Oncotype Score of 20 with distant recurrence risk at 9 years with AI or BRAXTON alone is 6% 04/19/19 Right breast wide resection by Dr. Knapp revealed LCIS, classic and pleomorphic types. No invasive carcinoma, DCIS or lymphovascular invasion identified. The resection margins are negative for carcinoma. Pleomorphic LCIS is 0.8 mm from the nearest margin (medial). 5/2019 Adjuvant radiation with Dr. Rinaldi 7/19 - 10/19 Anastrozole discontinued due to severe hot flashes 10/19 Letrozole started by Dr. Adhikari 10/14/19 Right breast 10 o'clock axis fine needle aspiration was negative for malignancy.  [de-identified] : 1.05 cm invasive carcinoma with ductal and lobular features, SBR 6-7/9, ER 80%, [place] 60%, HER-2 (2+) FISH (-) with LVI  0/2 SLN, Oncotype = 20 (RR 6%) [de-identified] : Lorraine started letrozole in 10/19 (after taking anastrozole for 3 months) and continues to tolerate it fairly well. However in the last year she has developed worsening knee pain which may be due to OA but she is starting to have difficulty getting up from a sitting position. She was formerly followed by Dr. Knapp and is now followed by [facility]. She had an episode of vaginal bleeding in 5/22 and saw a new GYN Dr. Sam Huynh who evaluated her including a sonogram and found no abnormalities. No recurrent vaginal bleeding since then. She had a left hip replacement on 1/30/23 at [facility]. She still has some numbness in her anterior left leg since the surgery. She had good results from her right hip replacement 17 years ago. She may need knee replacement  HEALTH MAINTENANCE: PCP: Dr. Christian Jha MD Mammogram: 2/22/24 BI-RADS 2 Pap smear: 5/22 negative Bone density DEXA scan: 07/27/22 showed T scores of 0.8 in spine, -1.5 in femoral neck and -0.6 in forearm                                           07/30/20 showed T scores of 1.6 in spine, and -0.6 in femoral neck Colonoscopy: 3/2020 with no polyps

## 2024-06-18 NOTE — PHYSICAL EXAM
[Fully active, able to carry on all pre-disease performance without restriction] : Status 0 - Fully active, able to carry on all pre-disease performance without restriction [Normal] : affect appropriate [de-identified] : Stable large seroma in upper outer right breast

## 2024-09-09 ENCOUNTER — RX RENEWAL (OUTPATIENT)
Age: 79
End: 2024-09-09

## 2024-11-30 ENCOUNTER — NON-APPOINTMENT (OUTPATIENT)
Age: 79
End: 2024-11-30

## 2025-01-31 NOTE — ASU PREOP CHECKLIST - TEMPERATURE IN CELSIUS (DEGREES C)
For information on Fall & Injury Prevention, visit: [email or web address] OR  [email or web address] OR  https://www.cdc.gov/steadi/patient.html 36.8

## 2025-03-26 ENCOUNTER — APPOINTMENT (OUTPATIENT)
Dept: SURGICAL ONCOLOGY | Facility: CLINIC | Age: 80
End: 2025-03-26
Payer: MEDICARE

## 2025-03-26 VITALS
OXYGEN SATURATION: 96 % | DIASTOLIC BLOOD PRESSURE: 70 MMHG | BODY MASS INDEX: 27.11 KG/M2 | SYSTOLIC BLOOD PRESSURE: 170 MMHG | WEIGHT: 153 LBS | HEART RATE: 82 BPM | HEIGHT: 63 IN

## 2025-03-26 DIAGNOSIS — C50.911 MALIGNANT NEOPLASM OF UNSPECIFIED SITE OF RIGHT FEMALE BREAST: ICD-10-CM

## 2025-03-26 PROCEDURE — 99214 OFFICE O/P EST MOD 30 MIN: CPT

## (undated) DEVICE — SUT VICRYL PLUS 1 27" CP UNDYED

## (undated) DEVICE — HOOD FLYTE STRYKER HELMET SHIELD

## (undated) DEVICE — SAW BLADE STRYKER SAGITTAL AGGRESSIVE 25X86.5X1.32MM

## (undated) DEVICE — DRAPE IOBAN 10" X 8"

## (undated) DEVICE — PACK TOTAL HIP

## (undated) DEVICE — DRAPE C ARM 41X140"

## (undated) DEVICE — VENODYNE/SCD SLEEVE CALF MEDIUM

## (undated) DEVICE — SOL IRR BAG NS 0.9% 3000ML

## (undated) DEVICE — DRSG STOCKINETTE TUBULAR COTTON 2PLY 6X72"

## (undated) DEVICE — NDL SPINAL 18G X 3.5" (PINK)

## (undated) DEVICE — DRAPE TOP SHEET 53" X 101"

## (undated) DEVICE — DRSG DERMABOND PRINEO 60CM

## (undated) DEVICE — DRAPE XL SHEET 77X98"

## (undated) DEVICE — POSITIONER POSITIONING ROLL  5X17"

## (undated) DEVICE — DRSG COBAN 6"

## (undated) DEVICE — SOL IRR POUR H2O 1500ML

## (undated) DEVICE — HANDPIECE INTERPULSE W MULTI TIP

## (undated) DEVICE — DRAPE BILATERAL LIMB 72" X 120"

## (undated) DEVICE — SUT STRATAFIX SPIRAL MONOCRYL PLUS 4-0 14CM PS-2 UNDYED

## (undated) DEVICE — DRAPE MAYO STAND 30"

## (undated) DEVICE — ELCTR AQUAMANTYS BIPOLAR SEALER 6.0

## (undated) DEVICE — WOUND IRR IRRISEPT W 0.5 CHG

## (undated) DEVICE — WARMING BLANKET UPPER ADULT

## (undated) DEVICE — SYR LUER LOK 50CC

## (undated) DEVICE — DRSG STOCKINETTE IMPERVIOUS XL

## (undated) DEVICE — SOL IRR POUR NS 0.9% 500ML

## (undated) DEVICE — ELCTR ROCKER SWITCH PENCIL BLUE 10FT

## (undated) DEVICE — SYR LUER LOK 20CC